# Patient Record
Sex: FEMALE | Race: WHITE | NOT HISPANIC OR LATINO | Employment: FULL TIME | URBAN - METROPOLITAN AREA
[De-identification: names, ages, dates, MRNs, and addresses within clinical notes are randomized per-mention and may not be internally consistent; named-entity substitution may affect disease eponyms.]

---

## 2017-09-08 ENCOUNTER — HOSPITAL ENCOUNTER (OUTPATIENT)
Dept: RADIOLOGY | Facility: MEDICAL CENTER | Age: 52
End: 2017-09-08
Attending: NURSE PRACTITIONER
Payer: COMMERCIAL

## 2017-09-08 DIAGNOSIS — Z12.31 ENCOUNTER FOR MAMMOGRAM TO ESTABLISH BASELINE MAMMOGRAM: ICD-10-CM

## 2017-09-08 PROCEDURE — G0202 SCR MAMMO BI INCL CAD: HCPCS

## 2018-09-13 ENCOUNTER — HOSPITAL ENCOUNTER (OUTPATIENT)
Dept: RADIOLOGY | Facility: MEDICAL CENTER | Age: 53
End: 2018-09-13
Attending: OBSTETRICS & GYNECOLOGY
Payer: COMMERCIAL

## 2018-09-13 DIAGNOSIS — Z12.31 SCREENING MAMMOGRAM, ENCOUNTER FOR: ICD-10-CM

## 2018-09-13 PROCEDURE — 77067 SCR MAMMO BI INCL CAD: CPT

## 2019-01-15 ENCOUNTER — HOSPITAL ENCOUNTER (OUTPATIENT)
Dept: LAB | Facility: MEDICAL CENTER | Age: 54
End: 2019-01-15
Attending: INTERNAL MEDICINE
Payer: COMMERCIAL

## 2019-01-15 LAB
ALBUMIN SERPL BCP-MCNC: 4.5 G/DL (ref 3.2–4.9)
ALBUMIN/GLOB SERPL: 1.4 G/DL
ALP SERPL-CCNC: 67 U/L (ref 30–99)
ALT SERPL-CCNC: 14 U/L (ref 2–50)
ANION GAP SERPL CALC-SCNC: 9 MMOL/L (ref 0–11.9)
AST SERPL-CCNC: 18 U/L (ref 12–45)
BASOPHILS # BLD AUTO: 0.5 % (ref 0–1.8)
BASOPHILS # BLD: 0.05 K/UL (ref 0–0.12)
BILIRUB SERPL-MCNC: 0.5 MG/DL (ref 0.1–1.5)
BUN SERPL-MCNC: 17 MG/DL (ref 8–22)
C3 SERPL-MCNC: 153 MG/DL (ref 87–200)
C4 SERPL-MCNC: 32 MG/DL (ref 19–52)
CALCIUM SERPL-MCNC: 9.4 MG/DL (ref 8.5–10.5)
CHLORIDE SERPL-SCNC: 101 MMOL/L (ref 96–112)
CO2 SERPL-SCNC: 29 MMOL/L (ref 20–33)
CREAT SERPL-MCNC: 0.9 MG/DL (ref 0.5–1.4)
CREAT UR-MCNC: 33.8 MG/DL
CRP SERPL HS-MCNC: 0.36 MG/DL (ref 0–0.75)
EOSINOPHIL # BLD AUTO: 0.08 K/UL (ref 0–0.51)
EOSINOPHIL NFR BLD: 0.8 % (ref 0–6.9)
ERYTHROCYTE [DISTWIDTH] IN BLOOD BY AUTOMATED COUNT: 41.1 FL (ref 35.9–50)
ERYTHROCYTE [SEDIMENTATION RATE] IN BLOOD BY WESTERGREN METHOD: 16 MM/HOUR (ref 0–30)
GLOBULIN SER CALC-MCNC: 3.2 G/DL (ref 1.9–3.5)
GLUCOSE SERPL-MCNC: 88 MG/DL (ref 65–99)
HCT VFR BLD AUTO: 47.1 % (ref 37–47)
HGB BLD-MCNC: 16.1 G/DL (ref 12–16)
IMM GRANULOCYTES # BLD AUTO: 0.09 K/UL (ref 0–0.11)
IMM GRANULOCYTES NFR BLD AUTO: 0.9 % (ref 0–0.9)
LYMPHOCYTES # BLD AUTO: 1.85 K/UL (ref 1–4.8)
LYMPHOCYTES NFR BLD: 19.3 % (ref 22–41)
MCH RBC QN AUTO: 30.6 PG (ref 27–33)
MCHC RBC AUTO-ENTMCNC: 34.2 G/DL (ref 33.6–35)
MCV RBC AUTO: 89.5 FL (ref 81.4–97.8)
MONOCYTES # BLD AUTO: 0.43 K/UL (ref 0–0.85)
MONOCYTES NFR BLD AUTO: 4.5 % (ref 0–13.4)
NEUTROPHILS # BLD AUTO: 7.09 K/UL (ref 2–7.15)
NEUTROPHILS NFR BLD: 74 % (ref 44–72)
NRBC # BLD AUTO: 0 K/UL
NRBC BLD-RTO: 0 /100 WBC
PLATELET # BLD AUTO: 349 K/UL (ref 164–446)
PMV BLD AUTO: 9.2 FL (ref 9–12.9)
POTASSIUM SERPL-SCNC: 3.6 MMOL/L (ref 3.6–5.5)
PROT SERPL-MCNC: 7.7 G/DL (ref 6–8.2)
PROT UR-MCNC: <4 MG/DL (ref 0–15)
PROT/CREAT UR: NORMAL MG/G (ref 10–107)
RBC # BLD AUTO: 5.26 M/UL (ref 4.2–5.4)
SODIUM SERPL-SCNC: 139 MMOL/L (ref 135–145)
WBC # BLD AUTO: 9.6 K/UL (ref 4.8–10.8)

## 2019-01-15 PROCEDURE — 84156 ASSAY OF PROTEIN URINE: CPT

## 2019-01-15 PROCEDURE — 86140 C-REACTIVE PROTEIN: CPT

## 2019-01-15 PROCEDURE — 85652 RBC SED RATE AUTOMATED: CPT

## 2019-01-15 PROCEDURE — 82570 ASSAY OF URINE CREATININE: CPT

## 2019-01-15 PROCEDURE — 86160 COMPLEMENT ANTIGEN: CPT

## 2019-01-15 PROCEDURE — 85025 COMPLETE CBC W/AUTO DIFF WBC: CPT

## 2019-01-15 PROCEDURE — 80053 COMPREHEN METABOLIC PANEL: CPT

## 2019-01-15 PROCEDURE — 36415 COLL VENOUS BLD VENIPUNCTURE: CPT

## 2019-05-14 ENCOUNTER — HOSPITAL ENCOUNTER (OUTPATIENT)
Dept: LAB | Facility: MEDICAL CENTER | Age: 54
End: 2019-05-14
Attending: INTERNAL MEDICINE
Payer: COMMERCIAL

## 2019-05-14 LAB
ALBUMIN SERPL BCP-MCNC: 4.1 G/DL (ref 3.2–4.9)
ALBUMIN/GLOB SERPL: 1.5 G/DL
ALP SERPL-CCNC: 62 U/L (ref 30–99)
ALT SERPL-CCNC: 16 U/L (ref 2–50)
ANION GAP SERPL CALC-SCNC: 10 MMOL/L (ref 0–11.9)
AST SERPL-CCNC: 18 U/L (ref 12–45)
BASOPHILS # BLD AUTO: 0.6 % (ref 0–1.8)
BASOPHILS # BLD: 0.06 K/UL (ref 0–0.12)
BILIRUB SERPL-MCNC: 0.3 MG/DL (ref 0.1–1.5)
BUN SERPL-MCNC: 15 MG/DL (ref 8–22)
C3 SERPL-MCNC: 138 MG/DL (ref 87–200)
C4 SERPL-MCNC: 29 MG/DL (ref 19–52)
CALCIUM SERPL-MCNC: 8.9 MG/DL (ref 8.5–10.5)
CHLORIDE SERPL-SCNC: 107 MMOL/L (ref 96–112)
CO2 SERPL-SCNC: 22 MMOL/L (ref 20–33)
CREAT SERPL-MCNC: 0.76 MG/DL (ref 0.5–1.4)
CREAT UR-MCNC: 52.2 MG/DL
CRP SERPL HS-MCNC: 1.21 MG/DL (ref 0–0.75)
EOSINOPHIL # BLD AUTO: 0.09 K/UL (ref 0–0.51)
EOSINOPHIL NFR BLD: 1 % (ref 0–6.9)
ERYTHROCYTE [DISTWIDTH] IN BLOOD BY AUTOMATED COUNT: 43 FL (ref 35.9–50)
ERYTHROCYTE [SEDIMENTATION RATE] IN BLOOD BY WESTERGREN METHOD: 5 MM/HOUR (ref 0–30)
GLOBULIN SER CALC-MCNC: 2.7 G/DL (ref 1.9–3.5)
GLUCOSE SERPL-MCNC: 81 MG/DL (ref 65–99)
HCT VFR BLD AUTO: 46.1 % (ref 37–47)
HGB BLD-MCNC: 15.6 G/DL (ref 12–16)
IMM GRANULOCYTES # BLD AUTO: 0.05 K/UL (ref 0–0.11)
IMM GRANULOCYTES NFR BLD AUTO: 0.5 % (ref 0–0.9)
LYMPHOCYTES # BLD AUTO: 1.65 K/UL (ref 1–4.8)
LYMPHOCYTES NFR BLD: 17.4 % (ref 22–41)
MCH RBC QN AUTO: 30.7 PG (ref 27–33)
MCHC RBC AUTO-ENTMCNC: 33.8 G/DL (ref 33.6–35)
MCV RBC AUTO: 90.7 FL (ref 81.4–97.8)
MONOCYTES # BLD AUTO: 0.55 K/UL (ref 0–0.85)
MONOCYTES NFR BLD AUTO: 5.8 % (ref 0–13.4)
NEUTROPHILS # BLD AUTO: 7.07 K/UL (ref 2–7.15)
NEUTROPHILS NFR BLD: 74.7 % (ref 44–72)
NRBC # BLD AUTO: 0 K/UL
NRBC BLD-RTO: 0 /100 WBC
PLATELET # BLD AUTO: 298 K/UL (ref 164–446)
PMV BLD AUTO: 9.4 FL (ref 9–12.9)
POTASSIUM SERPL-SCNC: 3.7 MMOL/L (ref 3.6–5.5)
PROT SERPL-MCNC: 6.8 G/DL (ref 6–8.2)
PROT UR-MCNC: 4.5 MG/DL (ref 0–15)
PROT/CREAT UR: 86 MG/G (ref 10–107)
RBC # BLD AUTO: 5.08 M/UL (ref 4.2–5.4)
SODIUM SERPL-SCNC: 139 MMOL/L (ref 135–145)
WBC # BLD AUTO: 9.5 K/UL (ref 4.8–10.8)

## 2019-05-14 PROCEDURE — 85025 COMPLETE CBC W/AUTO DIFF WBC: CPT

## 2019-05-14 PROCEDURE — 85652 RBC SED RATE AUTOMATED: CPT

## 2019-05-14 PROCEDURE — 86140 C-REACTIVE PROTEIN: CPT

## 2019-05-14 PROCEDURE — 36415 COLL VENOUS BLD VENIPUNCTURE: CPT

## 2019-05-14 PROCEDURE — 82570 ASSAY OF URINE CREATININE: CPT

## 2019-05-14 PROCEDURE — 84156 ASSAY OF PROTEIN URINE: CPT

## 2019-05-14 PROCEDURE — 86160 COMPLEMENT ANTIGEN: CPT

## 2019-05-14 PROCEDURE — 80053 COMPREHEN METABOLIC PANEL: CPT

## 2019-11-06 ENCOUNTER — HOSPITAL ENCOUNTER (OUTPATIENT)
Dept: RADIOLOGY | Facility: MEDICAL CENTER | Age: 54
End: 2019-11-06
Attending: OBSTETRICS & GYNECOLOGY
Payer: COMMERCIAL

## 2019-11-06 DIAGNOSIS — Z12.31 SCREENING MAMMOGRAM, ENCOUNTER FOR: ICD-10-CM

## 2019-11-06 PROCEDURE — 77063 BREAST TOMOSYNTHESIS BI: CPT

## 2020-05-06 ENCOUNTER — HOSPITAL ENCOUNTER (OUTPATIENT)
Dept: LAB | Facility: MEDICAL CENTER | Age: 55
End: 2020-05-06
Attending: INTERNAL MEDICINE
Payer: COMMERCIAL

## 2020-05-06 ENCOUNTER — HOSPITAL ENCOUNTER (OUTPATIENT)
Dept: RADIOLOGY | Facility: MEDICAL CENTER | Age: 55
End: 2020-05-06
Attending: NURSE PRACTITIONER
Payer: COMMERCIAL

## 2020-05-06 DIAGNOSIS — M25.511 RIGHT SHOULDER PAIN, UNSPECIFIED CHRONICITY: ICD-10-CM

## 2020-05-06 LAB
ALBUMIN SERPL BCP-MCNC: 4.1 G/DL (ref 3.2–4.9)
ALBUMIN/GLOB SERPL: 1.5 G/DL
ALP SERPL-CCNC: 64 U/L (ref 30–99)
ALT SERPL-CCNC: 18 U/L (ref 2–50)
ANION GAP SERPL CALC-SCNC: 11 MMOL/L (ref 7–16)
APPEARANCE UR: CLEAR
AST SERPL-CCNC: 20 U/L (ref 12–45)
BASOPHILS # BLD AUTO: 0.8 % (ref 0–1.8)
BASOPHILS # BLD: 0.05 K/UL (ref 0–0.12)
BILIRUB SERPL-MCNC: 0.6 MG/DL (ref 0.1–1.5)
BILIRUB UR QL STRIP.AUTO: NEGATIVE
BUN SERPL-MCNC: 15 MG/DL (ref 8–22)
C3 SERPL-MCNC: 133 MG/DL (ref 87–200)
C4 SERPL-MCNC: 25 MG/DL (ref 19–52)
CALCIUM SERPL-MCNC: 9 MG/DL (ref 8.5–10.5)
CHLORIDE SERPL-SCNC: 97 MMOL/L (ref 96–112)
CO2 SERPL-SCNC: 27 MMOL/L (ref 20–33)
COLOR UR: YELLOW
CREAT SERPL-MCNC: 0.94 MG/DL (ref 0.5–1.4)
CRP SERPL HS-MCNC: 0.64 MG/DL (ref 0–0.75)
EOSINOPHIL # BLD AUTO: 0.1 K/UL (ref 0–0.51)
EOSINOPHIL NFR BLD: 1.5 % (ref 0–6.9)
ERYTHROCYTE [DISTWIDTH] IN BLOOD BY AUTOMATED COUNT: 43.2 FL (ref 35.9–50)
ERYTHROCYTE [SEDIMENTATION RATE] IN BLOOD BY WESTERGREN METHOD: 1 MM/HOUR (ref 0–30)
GLOBULIN SER CALC-MCNC: 2.8 G/DL (ref 1.9–3.5)
GLUCOSE SERPL-MCNC: 82 MG/DL (ref 65–99)
GLUCOSE UR STRIP.AUTO-MCNC: NEGATIVE MG/DL
HCT VFR BLD AUTO: 46.9 % (ref 37–47)
HGB BLD-MCNC: 16 G/DL (ref 12–16)
IMM GRANULOCYTES # BLD AUTO: 0.04 K/UL (ref 0–0.11)
IMM GRANULOCYTES NFR BLD AUTO: 0.6 % (ref 0–0.9)
KETONES UR STRIP.AUTO-MCNC: NEGATIVE MG/DL
LEUKOCYTE ESTERASE UR QL STRIP.AUTO: NEGATIVE
LYMPHOCYTES # BLD AUTO: 1.73 K/UL (ref 1–4.8)
LYMPHOCYTES NFR BLD: 26.7 % (ref 22–41)
MCH RBC QN AUTO: 31 PG (ref 27–33)
MCHC RBC AUTO-ENTMCNC: 34.1 G/DL (ref 33.6–35)
MCV RBC AUTO: 90.9 FL (ref 81.4–97.8)
MICRO URNS: NORMAL
MONOCYTES # BLD AUTO: 0.45 K/UL (ref 0–0.85)
MONOCYTES NFR BLD AUTO: 6.9 % (ref 0–13.4)
NEUTROPHILS # BLD AUTO: 4.12 K/UL (ref 2–7.15)
NEUTROPHILS NFR BLD: 63.5 % (ref 44–72)
NITRITE UR QL STRIP.AUTO: NEGATIVE
NRBC # BLD AUTO: 0 K/UL
NRBC BLD-RTO: 0 /100 WBC
PH UR STRIP.AUTO: 6.5 [PH] (ref 5–8)
PLATELET # BLD AUTO: 353 K/UL (ref 164–446)
PMV BLD AUTO: 9.2 FL (ref 9–12.9)
POTASSIUM SERPL-SCNC: 3.6 MMOL/L (ref 3.6–5.5)
PROT SERPL-MCNC: 6.9 G/DL (ref 6–8.2)
PROT UR QL STRIP: NEGATIVE MG/DL
RBC # BLD AUTO: 5.16 M/UL (ref 4.2–5.4)
RBC UR QL AUTO: NEGATIVE
SODIUM SERPL-SCNC: 135 MMOL/L (ref 135–145)
SP GR UR STRIP.AUTO: 1.02
UROBILINOGEN UR STRIP.AUTO-MCNC: 0.2 MG/DL
WBC # BLD AUTO: 6.5 K/UL (ref 4.8–10.8)

## 2020-05-06 PROCEDURE — 80053 COMPREHEN METABOLIC PANEL: CPT

## 2020-05-06 PROCEDURE — 85652 RBC SED RATE AUTOMATED: CPT

## 2020-05-06 PROCEDURE — 86160 COMPLEMENT ANTIGEN: CPT | Mod: 91

## 2020-05-06 PROCEDURE — 36415 COLL VENOUS BLD VENIPUNCTURE: CPT

## 2020-05-06 PROCEDURE — 86140 C-REACTIVE PROTEIN: CPT

## 2020-05-06 PROCEDURE — 73030 X-RAY EXAM OF SHOULDER: CPT | Mod: RT

## 2020-05-06 PROCEDURE — 81003 URINALYSIS AUTO W/O SCOPE: CPT

## 2020-05-06 PROCEDURE — 85025 COMPLETE CBC W/AUTO DIFF WBC: CPT

## 2020-05-07 LAB
CREAT UR-MCNC: 125.87 MG/DL
PROT UR-MCNC: 11 MG/DL (ref 0–15)
PROT/CREAT UR: 87 MG/G (ref 10–107)

## 2020-05-12 ENCOUNTER — TELEPHONE (OUTPATIENT)
Dept: SCHEDULING | Facility: IMAGING CENTER | Age: 55
End: 2020-05-12

## 2020-06-03 ENCOUNTER — OFFICE VISIT (OUTPATIENT)
Dept: MEDICAL GROUP | Facility: PHYSICIAN GROUP | Age: 55
End: 2020-06-03
Payer: COMMERCIAL

## 2020-06-03 VITALS
HEIGHT: 64 IN | TEMPERATURE: 98.4 F | SYSTOLIC BLOOD PRESSURE: 124 MMHG | DIASTOLIC BLOOD PRESSURE: 82 MMHG | HEART RATE: 88 BPM | BODY MASS INDEX: 32.54 KG/M2 | RESPIRATION RATE: 20 BRPM | WEIGHT: 190.6 LBS | OXYGEN SATURATION: 96 %

## 2020-06-03 DIAGNOSIS — I10 HYPERTENSION, UNSPECIFIED TYPE: ICD-10-CM

## 2020-06-03 DIAGNOSIS — J30.89 ENVIRONMENTAL AND SEASONAL ALLERGIES: ICD-10-CM

## 2020-06-03 DIAGNOSIS — G43.109 MIGRAINE WITH AURA AND WITHOUT STATUS MIGRAINOSUS, NOT INTRACTABLE: ICD-10-CM

## 2020-06-03 DIAGNOSIS — I73.00 RAYNAUD'S DISEASE WITHOUT GANGRENE: ICD-10-CM

## 2020-06-03 DIAGNOSIS — M32.9 SYSTEMIC LUPUS ERYTHEMATOSUS, UNSPECIFIED SLE TYPE, UNSPECIFIED ORGAN INVOLVEMENT STATUS (HCC): ICD-10-CM

## 2020-06-03 DIAGNOSIS — M35.00 SJOGREN'S SYNDROME, WITH UNSPECIFIED ORGAN INVOLVEMENT (HCC): ICD-10-CM

## 2020-06-03 PROCEDURE — 99204 OFFICE O/P NEW MOD 45 MIN: CPT | Performed by: PHYSICIAN ASSISTANT

## 2020-06-03 RX ORDER — CYCLOBENZAPRINE HCL 10 MG
TABLET ORAL
COMMUNITY

## 2020-06-03 RX ORDER — ONABOTULINUMTOXINA 200 [USP'U]/1
INJECTION, POWDER, LYOPHILIZED, FOR SOLUTION INTRADERMAL; INTRAMUSCULAR
COMMUNITY

## 2020-06-03 RX ORDER — LIDOCAINE 50 MG/G
PATCH TOPICAL
COMMUNITY
Start: 2020-04-08

## 2020-06-03 RX ORDER — DICLOFENAC POTASSIUM 50 MG/1
POWDER, FOR SOLUTION ORAL
COMMUNITY

## 2020-06-03 RX ORDER — HYDROCHLOROTHIAZIDE 12.5 MG/1
TABLET ORAL
COMMUNITY

## 2020-06-03 RX ORDER — HYDROXYCHLOROQUINE SULFATE 200 MG/1
TABLET, FILM COATED ORAL
COMMUNITY

## 2020-06-03 RX ORDER — TOPIRAMATE 100 MG/1
CAPSULE, EXTENDED RELEASE ORAL
COMMUNITY
End: 2023-09-14

## 2020-06-03 RX ORDER — AZATHIOPRINE 50 MG/1
TABLET ORAL
COMMUNITY
Start: 2020-05-07

## 2020-06-03 RX ORDER — NABUMETONE 750 MG/1
TABLET, FILM COATED ORAL
COMMUNITY
Start: 2020-03-07

## 2020-06-03 RX ORDER — LOSARTAN POTASSIUM 100 MG/1
TABLET ORAL
COMMUNITY
Start: 2020-05-28

## 2020-06-03 RX ORDER — MONTELUKAST SODIUM 10 MG/1
TABLET ORAL
COMMUNITY

## 2020-06-03 ASSESSMENT — FIBROSIS 4 INDEX: FIB4 SCORE: 0.69

## 2020-06-03 ASSESSMENT — PATIENT HEALTH QUESTIONNAIRE - PHQ9: CLINICAL INTERPRETATION OF PHQ2 SCORE: 0

## 2020-06-12 NOTE — PROGRESS NOTES
Chief Complaint   Patient presents with   • Establish Care   • Referral Needed     january sebastian        HISTORY OF THE PRESENT ILLNESS: Eveline Galo is a 54 y.o. female new patient to our practice. This pleasant patient is here today to establish care and to discuss the evaluation and management of:    Patient is a 54-year-old pleasant female here today to establish care but she tells me that she actually needs referral back to her PCP.      Systemic lupus erythematosus, unspecified SLE type, unspecified organ involvement status (HCC)  Raynaud's disease without gangrene  Sjogren's syndrome, with unspecified organ involvement (HCC)  Chronic but stable problem.  Patient is taking medications as prescribed.    Patient is prescribed chloroquine, Lexapro, Imuran, diclofenac, nabumetone, and flector patch.  States medications are managed by her PCP.  Does not need refills.  States she is doing well.  Denies side effects or complications of medication.      Hypertension, unspecified type  Chronic but stable problem.  Patient's blood pressure during today's appointment 124/82 mmHg. patient is currently taking Losartan 100 mg tab once daily and hydrochlorothiazide 12.5 mg tab once daily..  States she is compliant with medications and experiences no side effects or complications of medications.  Denies chest pain, shortness breath, heart palpitations, dizziness, syncope constipated, vision changes, peripheral edema.    Environmental and seasonal allergies  Chronic but stable problem.  Symptoms are managed with montelukast.  Denies side effects or complications medication.  Patient is doing well.      Migraine with aura and without status migrainosus, not intractable  Chronic but stable problem.  Patient follows up with [facility] neurosurgery and sees [facility] for Botox injections.  States she also takes topiramate.  States she is doing well.  No further work-up indicated.  Denies side effects or  complications.        No past medical history on file.    History reviewed. No pertinent surgical history.    Family Status   Relation Name Status   • Mo     • Fa     • Sis x3 Alive   • Bro x8 Alive   • Son  Alive   • Son  Alive   • Son Adopted Alive     Family History   Problem Relation Age of Onset   • Cancer Mother         Brain CA and Lung CA   • Cancer Father    • No Known Problems Son    • No Known Problems Son    • No Known Problems Son        Social History     Tobacco Use   • Smoking status: Never Smoker   • Smokeless tobacco: Never Used   Substance Use Topics   • Alcohol use: Yes     Comment: occ.   • Drug use: Never       Allergies: Galcanezumab-gnlm; Keflex; Penicillins; and Sulfa drugs    Current Outpatient Medications Ordered in Epic   Medication Sig Dispense Refill   • cyclobenzaprine (FLEXERIL) 10 MG Tab cyclobenzaprine 10 mg tablet     • nabumetone (RELAFEN) 750 MG Tab TK 2 TS PO QD     • hydroxychloroquine (PLAQUENIL) 200 MG Tab hydroxychloroquine 200 mg tablet     • losartan (COZAAR) 100 MG Tab      • Topiramate ER (TROKENDI XR) 100 MG CAPSULE SR 24 HR Trokendi  mg capsule, extended release     • onabotulinum toxin type A (BOTOX) 200 units Recon Soln Botox 200 unit injection     • Diclofenac Potassium (CAMBIA) 50 MG Pack Cambia 50 mg oral powder packet   Take 1 packet every day by oral route as needed.     • azaTHIOprine (IMURAN) 50 MG Tab TK 3 TS PO D     • hydroCHLOROthiazide (HYDRODIURIL) 12.5 MG tablet hydrochlorothiazide 12.5 mg tablet     • montelukast (SINGULAIR) 10 MG Tab montelukast 10 mg tablet     • lidocaine (LIDODERM) 5 % Patch TAKE 1 TOPICAL TRANSDERMAL PATCH ONCE A DAY X 12 HOURS THEN REMOVE FOR 12HRS     • FLECTOR 1.3 % Patch TAKE 1 TRANSDERMAL TRANSDERMAL PATCH 2 TIMES A DAY ONLY BRAND COVERED BY INSURANCE DW2       No current Epic-ordered facility-administered medications on file.        Review of Systems   Constitutional: Negative for fever, chills, weight  "loss and malaise/fatigue.   HENT: Negative for ear pain, nosebleeds, congestion, sore throat and neck pain.    Eyes: Negative for blurred vision.   Respiratory: Negative for cough, sputum production, shortness of breath and wheezing.    Cardiovascular: Negative for chest pain, palpitations, orthopnea and leg swelling.   Gastrointestinal: Negative for heartburn, nausea, vomiting and abdominal pain.   Genitourinary: Negative for dysuria, urgency and frequency.   Musculoskeletal: + for myalgias, back pain and joint pain.   Skin: Negative for rash and itching.   Neurological: Negative for dizziness, tingling, tremors, sensory change, focal weakness and headaches.   Endo/Heme/Allergies: Does not bruise/bleed easily.   Psychiatric/Behavioral: Negative for depression, anxiety, or memory loss.     All other systems reviewed and are negative except as in HPI.    Exam: /82 (BP Location: Left arm, Patient Position: Sitting)   Pulse 88   Temp 36.9 °C (98.4 °F) (Temporal)   Resp 20   Ht 1.632 m (5' 4.25\")   Wt 86.5 kg (190 lb 9.6 oz)   SpO2 96%  Body mass index is 32.46 kg/m².  General: Normal appearing. No distress.  HEENT: Normocephalic. Eyes conjunctiva clear lids without ptosis, ears normal shape and contour.  Neck: Supple without JVD. Thyroid is not enlarged.  Pulmonary: Clear to ausculation.  Normal effort. No rales, ronchi, or wheezing.  Cardiovascular: Regular rate and rhythm without murmur.   Abdomen: Soft, nontender, nondistended.   Neurologic: Grossly nonfocal  Skin: Warm and dry.  No obvious lesions.  Musculoskeletal: Normal gait. No extremity cyanosis, clubbing, or edema.  Psych: Normal mood and affect. Alert and oriented x3. Judgment and insight is normal.      Medical decision-making and discussion:  1. Systemic lupus erythematosus, unspecified SLE type, unspecified organ involvement status (HCC)  Chronic but stable problem.  Continue current medication regimen.  Continue to monitor.  - REFERRAL TO " FAMILY PRACTICE    2. Raynaud's disease without gangrene  Chronic but stable problem.  Continue current medication regimen.  Continue to monitor.  - REFERRAL TO FAMILY PRACTICE    3. Sjogren's syndrome, with unspecified organ involvement (HCC)  Chronic but stable problem.  Continue current medication regimen.  Continue to monitor.  - REFERRAL TO FAMILY PRACTICE    4. Hypertension, unspecified type  Well-controlled. Labs as indicated. Continue antihypertensive medications. Discussed decreasing salt intake. Emphasized benefits of exercise and diet. Continue to monitor.    - REFERRAL TO FAMILY PRACTICE    5. Environmental and seasonal allergies  Chronic but stable problem.  Continue current medication regimen.  Continue monitor.  - REFERRAL TO FAMILY PRACTICE    6. Migraine with aura and without status migrainosus, not intractable  Chronic but stable problem.  Avoid known triggers.  Continue current medication regimen.  Continue monitor.  - REFERRAL TO FAMILY PRACTICE      Please note that this dictation was created using voice recognition software. I have made every reasonable attempt to correct obvious errors, but I expect that there are errors of grammar and possibly content that I did not discover before finalizing the note.      Assessment/Plan  1. Systemic lupus erythematosus, unspecified SLE type, unspecified organ involvement status (HCC)  REFERRAL TO FAMILY PRACTICE    CANCELED: REFERRAL TO FAMILY PRACTICE   2. Raynaud's disease without gangrene  REFERRAL TO FAMILY PRACTICE    CANCELED: REFERRAL TO FAMILY PRACTICE   3. Sjogren's syndrome, with unspecified organ involvement (HCC)  REFERRAL TO FAMILY PRACTICE    CANCELED: REFERRAL TO FAMILY PRACTICE   4. Hypertension, unspecified type  REFERRAL TO FAMILY PRACTICE    CANCELED: REFERRAL TO FAMILY PRACTICE   5. Environmental and seasonal allergies  REFERRAL TO FAMILY PRACTICE    CANCELED: REFERRAL TO FAMILY PRACTICE   6. Migraine with aura and without status  migrainosus, not intractable  REFERRAL TO FAMILY PRACTICE       Return if symptoms worsen or fail to improve.

## 2020-07-13 ENCOUNTER — OFFICE VISIT (OUTPATIENT)
Dept: URGENT CARE | Facility: CLINIC | Age: 55
End: 2020-07-13
Payer: COMMERCIAL

## 2020-07-13 VITALS
RESPIRATION RATE: 18 BRPM | TEMPERATURE: 97.6 F | HEIGHT: 64 IN | HEART RATE: 76 BPM | BODY MASS INDEX: 32.72 KG/M2 | OXYGEN SATURATION: 97 % | DIASTOLIC BLOOD PRESSURE: 78 MMHG | SYSTOLIC BLOOD PRESSURE: 132 MMHG

## 2020-07-13 DIAGNOSIS — G43.819 OTHER MIGRAINE WITHOUT STATUS MIGRAINOSUS, INTRACTABLE: ICD-10-CM

## 2020-07-13 PROCEDURE — 99214 OFFICE O/P EST MOD 30 MIN: CPT | Performed by: FAMILY MEDICINE

## 2020-07-13 RX ORDER — BUTALBITAL, ACETAMINOPHEN AND CAFFEINE 50; 325; 40 MG/1; MG/1; MG/1
1 TABLET ORAL EVERY 6 HOURS PRN
Qty: 10 TAB | Refills: 0 | Status: SHIPPED | OUTPATIENT
Start: 2020-07-13 | End: 2020-07-23

## 2020-07-13 RX ORDER — TIZANIDINE 4 MG/1
4 TABLET ORAL EVERY 6 HOURS PRN
Qty: 30 TAB | Refills: 0 | Status: SHIPPED | OUTPATIENT
Start: 2020-07-13

## 2020-07-14 ENCOUNTER — TELEPHONE (OUTPATIENT)
Dept: URGENT CARE | Facility: CLINIC | Age: 55
End: 2020-07-14

## 2020-07-14 ENCOUNTER — HOSPITAL ENCOUNTER (OUTPATIENT)
Dept: RADIOLOGY | Facility: MEDICAL CENTER | Age: 55
End: 2020-07-14
Attending: FAMILY MEDICINE
Payer: COMMERCIAL

## 2020-07-14 DIAGNOSIS — G43.819 OTHER MIGRAINE WITHOUT STATUS MIGRAINOSUS, INTRACTABLE: ICD-10-CM

## 2020-07-14 PROCEDURE — 70450 CT HEAD/BRAIN W/O DYE: CPT

## 2020-07-14 NOTE — PROGRESS NOTES
Chief Complaint   Patient presents with   • Migraine     x2 days, migraine for two days              Migraine   This is a new problem. The current episode started in the past 2 days. The problem occurs intermittently. The problem has been unchanged. The pain is located in the  right temporal region. The pain does not radiate. The pain quality is different from  prior headaches. The quality of the pain is described as sharp.  she also has some upper back/neck pain.   Associated symptoms include   nausea, blurry vision,  and photophobia. Pertinent negatives include no abdominal pain or fever. The symptoms are aggravated by activity and bright light. Patient has tried flexeril for the symptoms. The treatment provided no relief.  past medical history is significant for migraine headaches - on daily Topamax       Social History     Tobacco Use   • Smoking status: Never Smoker   • Smokeless tobacco: Never Used   Substance Use Topics   • Alcohol use: Yes     Comment: occ.   • Drug use: Never          past med hx: migranes    Current Outpatient Medications on File Prior to Visit   Medication Sig Dispense Refill   • cyclobenzaprine (FLEXERIL) 10 MG Tab cyclobenzaprine 10 mg tablet     • nabumetone (RELAFEN) 750 MG Tab TK 2 TS PO QD     • hydroxychloroquine (PLAQUENIL) 200 MG Tab hydroxychloroquine 200 mg tablet     • losartan (COZAAR) 100 MG Tab      • Topiramate ER (TROKENDI XR) 100 MG CAPSULE SR 24 HR Trokendi  mg capsule, extended release     • onabotulinum toxin type A (BOTOX) 200 units Recon Soln Botox 200 unit injection     • Diclofenac Potassium (CAMBIA) 50 MG Pack Cambia 50 mg oral powder packet   Take 1 packet every day by oral route as needed.     • azaTHIOprine (IMURAN) 50 MG Tab TK 3 TS PO D     • hydroCHLOROthiazide (HYDRODIURIL) 12.5 MG tablet hydrochlorothiazide 12.5 mg tablet     • montelukast (SINGULAIR) 10 MG Tab montelukast 10 mg tablet     • lidocaine (LIDODERM) 5 % Patch TAKE 1 TOPICAL  "TRANSDERMAL PATCH ONCE A DAY X 12 HOURS THEN REMOVE FOR 12HRS     • FLECTOR 1.3 % Patch TAKE 1 TRANSDERMAL TRANSDERMAL PATCH 2 TIMES A DAY ONLY BRAND COVERED BY INSURANCE DW2       No current facility-administered medications on file prior to visit.            Review of Systems   Constitutional: Negative for fever and chills.   Eyes: Positive for photophobia.   Respiratory: Negative for shortness of breath.    Cardiovascular: Negative for chest pain and palpitations.   Gastrointestinal: Positive for nausea. Negative for abdominal pain.   Skin: Negative for rash.   Neurological: Positive for   headaches.   Psychiatric/Behavioral: The patient is not nervous/anxious.    All other systems reviewed and are negative.         Objective:     /78   Pulse 76   Temp 36.4 °C (97.6 °F) (Temporal)   Resp 18   Ht 1.626 m (5' 4\")   SpO2 97%     Physical Exam   Constitutional: pt is oriented to person, place, and time.  Pt appears well-developed and well-nourished. No distress.   HENT:   Head: Normocephalic and atraumatic.   Mouth/Throat: Oropharynx is clear and moist. No oropharyngeal exudate.   Eyes: Conjunctivae and EOM are normal. Pupils are equal, round, and reactive to light. Right eye exhibits no discharge. Left eye exhibits no discharge. No scleral icterus.   Neck: Neck supple.   Cardiovascular: Normal rate and regular rhythm.    Pulmonary/Chest: Effort normal.   Lymphadenopathy:     Pt has no cervical adenopathy.   c-spine:  Full AROM.  No bony tenderness.    There is TTP over both upper trapezius muscles  Neurologic: Alert and oriented. Cranial nerves II-XII intact, EOMs intact, no tongue deviation, PERRL, no facial asymmetry to motor or sensation, symmetric palate, normal finger-to-nose test, no pronator drift. No focal motor deficits. Symmetric reflexes. Normal station and gait, normal tandem walk. Coordination normal.   Skin: Skin is warm. Pt is not diaphoretic. No erythema. No pallor.   Psychiatric:  " behavior is normal.   Nursing note and vitals reviewed.              Assessment/Plan:          1. Other migraine without status migrainosus, intractable     CT ordered to r/o intracranial process    - tizanidine (ZANAFLEX) 4 MG Tab; Take 1 Tab by mouth every 6 hours as needed.  Dispense: 30 Tab; Refill: 0    - acetaminophen/caffeine/butalbital 325-40-50 mg (FIORICET) -40 MG Tab; Take 1 Tab by mouth every 6 hours as needed for Headache for up to 10 days.  Dispense: 10 Tab; Refill: 0

## 2020-07-30 ENCOUNTER — HOSPITAL ENCOUNTER (EMERGENCY)
Facility: MEDICAL CENTER | Age: 55
End: 2020-07-30
Attending: EMERGENCY MEDICINE
Payer: COMMERCIAL

## 2020-07-30 VITALS
WEIGHT: 187.17 LBS | SYSTOLIC BLOOD PRESSURE: 114 MMHG | OXYGEN SATURATION: 94 % | DIASTOLIC BLOOD PRESSURE: 84 MMHG | HEIGHT: 64 IN | RESPIRATION RATE: 19 BRPM | TEMPERATURE: 97.2 F | BODY MASS INDEX: 31.95 KG/M2 | HEART RATE: 61 BPM

## 2020-07-30 DIAGNOSIS — R51.9 OCULAR HEADACHE: ICD-10-CM

## 2020-07-30 DIAGNOSIS — G43.911 INTRACTABLE MIGRAINE WITH STATUS MIGRAINOSUS, UNSPECIFIED MIGRAINE TYPE: ICD-10-CM

## 2020-07-30 PROCEDURE — 99284 EMERGENCY DEPT VISIT MOD MDM: CPT

## 2020-07-30 PROCEDURE — 700111 HCHG RX REV CODE 636 W/ 250 OVERRIDE (IP): Performed by: EMERGENCY MEDICINE

## 2020-07-30 PROCEDURE — 96375 TX/PRO/DX INJ NEW DRUG ADDON: CPT

## 2020-07-30 PROCEDURE — 96374 THER/PROPH/DIAG INJ IV PUSH: CPT

## 2020-07-30 PROCEDURE — 700105 HCHG RX REV CODE 258: Performed by: EMERGENCY MEDICINE

## 2020-07-30 RX ORDER — SODIUM CHLORIDE 9 MG/ML
1000 INJECTION, SOLUTION INTRAVENOUS ONCE
Status: COMPLETED | OUTPATIENT
Start: 2020-07-30 | End: 2020-07-30

## 2020-07-30 RX ORDER — PROCHLORPERAZINE EDISYLATE 5 MG/ML
10 INJECTION INTRAMUSCULAR; INTRAVENOUS ONCE
Status: COMPLETED | OUTPATIENT
Start: 2020-07-30 | End: 2020-07-30

## 2020-07-30 RX ORDER — KETOROLAC TROMETHAMINE 10 MG/1
10 TABLET, FILM COATED ORAL 3 TIMES DAILY PRN
Qty: 15 TAB | Refills: 0 | Status: SHIPPED | OUTPATIENT
Start: 2020-07-30

## 2020-07-30 RX ORDER — DIPHENHYDRAMINE HYDROCHLORIDE 50 MG/ML
50 INJECTION INTRAMUSCULAR; INTRAVENOUS ONCE
Status: COMPLETED | OUTPATIENT
Start: 2020-07-30 | End: 2020-07-30

## 2020-07-30 RX ORDER — PROCHLORPERAZINE MALEATE 10 MG
10 TABLET ORAL EVERY 6 HOURS PRN
Qty: 30 TAB | Refills: 3 | Status: SHIPPED | OUTPATIENT
Start: 2020-07-30

## 2020-07-30 RX ORDER — KETOROLAC TROMETHAMINE 30 MG/ML
30 INJECTION, SOLUTION INTRAMUSCULAR; INTRAVENOUS ONCE
Status: COMPLETED | OUTPATIENT
Start: 2020-07-30 | End: 2020-07-30

## 2020-07-30 RX ADMIN — PROCHLORPERAZINE EDISYLATE 10 MG: 5 INJECTION INTRAMUSCULAR; INTRAVENOUS at 02:13

## 2020-07-30 RX ADMIN — DIPHENHYDRAMINE HYDROCHLORIDE 50 MG: 50 INJECTION, SOLUTION INTRAMUSCULAR; INTRAVENOUS at 02:14

## 2020-07-30 RX ADMIN — KETOROLAC TROMETHAMINE 30 MG: 30 INJECTION, SOLUTION INTRAMUSCULAR at 02:14

## 2020-07-30 RX ADMIN — SODIUM CHLORIDE 1000 ML: 9 INJECTION, SOLUTION INTRAVENOUS at 02:14

## 2020-07-30 ASSESSMENT — FIBROSIS 4 INDEX: FIB4 SCORE: 0.71

## 2020-07-30 NOTE — ED PROVIDER NOTES
CHIEF COMPLAINT  Chief Complaint   Patient presents with   • Migraine     started w/ a regular H/A while at work today  took tylenol w/ little effect   now has turned into a migraine        HPI  Eveline Galo is a 55 y.o. female who presents tonight with her  secondary to an intractable headache since 6 PM tonight.  She states it started out as a regular headache while at work today she took Tylenol had little effects.  She states that then developed into a migraine headache and she took her regular migraine medications with no relief.  This included Fioricet and muscle relaxers.  Now she feels that it has turned into an ocular headache and she complains of pain behind the left eye.  She denies any head trauma.  She denies any neck pain, she has some mild nausea with no vomiting.  She has had no fever or chills.  She denies this being the worst headache of her life.  She states she recently had an atypical migraine about 2 weeks ago and was treated at urgent care for that.    REVIEW OF SYSTEMS  See HPI for further details. All other system reviews are negative.    PAST MEDICAL HISTORY  Systemic lupus  Migraine headaches  Ocular headaches    FAMILY HISTORY  Family History   Problem Relation Age of Onset   • Cancer Mother         Brain CA and Lung CA   • Cancer Father    • No Known Problems Son    • No Known Problems Son    • No Known Problems Son        SOCIAL HISTORY  Social History     Socioeconomic History   • Marital status:      Spouse name: Not on file   • Number of children: Not on file   • Years of education: Not on file   • Highest education level: Not on file   Occupational History   • Not on file   Social Needs   • Financial resource strain: Not on file   • Food insecurity     Worry: Not on file     Inability: Not on file   • Transportation needs     Medical: Not on file     Non-medical: Not on file   Tobacco Use   • Smoking status: Never Smoker   • Smokeless tobacco: Never Used  "  Substance and Sexual Activity   • Alcohol use: Yes     Comment: occ.   • Drug use: Never   • Sexual activity: Yes     Partners: Male     Comment: .    Lifestyle   • Physical activity     Days per week: Not on file     Minutes per session: Not on file   • Stress: Not on file   Relationships   • Social connections     Talks on phone: Not on file     Gets together: Not on file     Attends Evangelical service: Not on file     Active member of club or organization: Not on file     Attends meetings of clubs or organizations: Not on file     Relationship status: Not on file   • Intimate partner violence     Fear of current or ex partner: Not on file     Emotionally abused: Not on file     Physically abused: Not on file     Forced sexual activity: Not on file   Other Topics Concern   • Not on file   Social History Narrative   • Not on file       SURGICAL HISTORY  History reviewed. No pertinent surgical history.    CURRENT MEDICATIONS  See nurses notes    ALLERGIES  Allergies   Allergen Reactions   • Food      coconut    • Galcanezumab-Gnlm    • Keflex    • Penicillins    • Sulfa Drugs        PHYSICAL EXAM  VITAL SIGNS: /95   Pulse 66   Temp 36.2 °C (97.2 °F) (Temporal)   Resp 19   Ht 1.626 m (5' 4\")   Wt 84.9 kg (187 lb 2.7 oz)   LMP 07/04/2020 (Approximate)   SpO2 94%   BMI 32.13 kg/m²     Constitutional: Patient is well developed, well nourished in moderate distress from her headache.  She is very photophobic.  HENT: Normocephalic, atraumatic, Oropharynx dry, nose normal with no mucosal edema.   Eyes: PERRL, EOMI, Conjunctiva without erythema or exudates.   Neck: Supple with Normal range of motion in flexion, extension and lateral rotation. No tenderness along the bony prominences or paraspinal muscles.  Lymphatic: No lymphadenopathy noted.   Cardiovascular: Normal heart rate and rhythm. No murmur  Thorax & Lungs: Clear and equal breath sounds with good excursion. No respiratory distress.  Abdomen: " Bowel sounds normal in all four quadrants. Soft,nontender.   Skin: Warm, Dry,  No rashes.   Back: No cervical, thoracic, or lumbosacral tenderness.   Extremities: Peripheral pulses 4/4 No tenderness  Musculoskeletal: Normal range of motion in all major joints.  Neurologic: Alert & oriented x 3, Normal motor function, Normal sensory function, No lateralizing or focal deficits noted. DTR's 4/4 bilaterally.  Psychiatric: Affect normal, Judgment normal, Mood normal.       COURSE & MEDICAL DECISION MAKING  Pertinent Labs & Imaging studies reviewed. (See chart for details)  Patient received an IV of normal saline x1 L fluid bolus, Compazine, Toradol and Benadryl.  She is resting comfortably after the medications.  Patient states that her headache is gone.  My plan will be to discharge her home.  Prescriptions for Compazine and Toradol have been sent to her pharmacy.  She is to be at bed rest, increase fluids, follow-up with her primary care provider within next 2 to 3 days for recheck and return if problems or worsening.  She is stable upon discharge.    FINAL IMPRESSION  1.  Acute migraine headache  2.  Ocular headache  3.  Nausea  4.  History of lupus         Electronically signed by: Adrianna Villalobos D.O., 7/30/2020 3:11 [facility] Provider Note

## 2020-07-30 NOTE — ED NOTES
Pt resting on gurney, eyes closed, respirations even and equal  Male guest at bedside  IVF infusing

## 2020-07-30 NOTE — ED NOTES
Pt discharged, reviewed all discharge instructions including medications and follow up, pt verbalizes understanding, and denies questions. Electronic prescription discussed with pt.  Escorted to lobby.

## 2020-07-30 NOTE — DISCHARGE INSTRUCTIONS
Rest for the next 24 hours  Increase fluids  Continue your current medications and follow-up with your primary doctor within the next 2 to 3 days if symptoms persist.

## 2020-09-04 ENCOUNTER — HOSPITAL ENCOUNTER (OUTPATIENT)
Dept: LAB | Facility: MEDICAL CENTER | Age: 55
End: 2020-09-04
Attending: INTERNAL MEDICINE
Payer: COMMERCIAL

## 2020-09-04 LAB
ALBUMIN SERPL BCP-MCNC: 4.1 G/DL (ref 3.2–4.9)
ALBUMIN/GLOB SERPL: 1.5 G/DL
ALP SERPL-CCNC: 76 U/L (ref 30–99)
ALT SERPL-CCNC: 17 U/L (ref 2–50)
ANION GAP SERPL CALC-SCNC: 11 MMOL/L (ref 7–16)
APPEARANCE UR: CLEAR
AST SERPL-CCNC: 20 U/L (ref 12–45)
BACTERIA #/AREA URNS HPF: NEGATIVE /HPF
BASOPHILS # BLD AUTO: 0.8 % (ref 0–1.8)
BASOPHILS # BLD: 0.08 K/UL (ref 0–0.12)
BILIRUB SERPL-MCNC: 0.4 MG/DL (ref 0.1–1.5)
BILIRUB UR QL STRIP.AUTO: NEGATIVE
BUN SERPL-MCNC: 17 MG/DL (ref 8–22)
C3 SERPL-MCNC: 124.6 MG/DL (ref 87–200)
C4 SERPL-MCNC: 23.1 MG/DL (ref 19–52)
CALCIUM SERPL-MCNC: 9.1 MG/DL (ref 8.5–10.5)
CHLORIDE SERPL-SCNC: 101 MMOL/L (ref 96–112)
CO2 SERPL-SCNC: 26 MMOL/L (ref 20–33)
COLOR UR: YELLOW
CREAT SERPL-MCNC: 0.79 MG/DL (ref 0.5–1.4)
CREAT UR-MCNC: 11.32 MG/DL
CRP SERPL HS-MCNC: 0.74 MG/DL (ref 0–0.75)
EOSINOPHIL # BLD AUTO: 0.12 K/UL (ref 0–0.51)
EOSINOPHIL NFR BLD: 1.3 % (ref 0–6.9)
EPI CELLS #/AREA URNS HPF: NEGATIVE /HPF
ERYTHROCYTE [DISTWIDTH] IN BLOOD BY AUTOMATED COUNT: 43.9 FL (ref 35.9–50)
FASTING STATUS PATIENT QL REPORTED: NORMAL
GLOBULIN SER CALC-MCNC: 2.8 G/DL (ref 1.9–3.5)
GLUCOSE SERPL-MCNC: 67 MG/DL (ref 65–99)
GLUCOSE UR STRIP.AUTO-MCNC: NEGATIVE MG/DL
HCT VFR BLD AUTO: 47.9 % (ref 37–47)
HGB BLD-MCNC: 15.7 G/DL (ref 12–16)
HYALINE CASTS #/AREA URNS LPF: NORMAL /LPF
IMM GRANULOCYTES # BLD AUTO: 0.09 K/UL (ref 0–0.11)
IMM GRANULOCYTES NFR BLD AUTO: 0.9 % (ref 0–0.9)
KETONES UR STRIP.AUTO-MCNC: NEGATIVE MG/DL
LEUKOCYTE ESTERASE UR QL STRIP.AUTO: NEGATIVE
LYMPHOCYTES # BLD AUTO: 1.45 K/UL (ref 1–4.8)
LYMPHOCYTES NFR BLD: 15.2 % (ref 22–41)
MCH RBC QN AUTO: 30.8 PG (ref 27–33)
MCHC RBC AUTO-ENTMCNC: 32.8 G/DL (ref 33.6–35)
MCV RBC AUTO: 93.9 FL (ref 81.4–97.8)
MICRO URNS: ABNORMAL
MONOCYTES # BLD AUTO: 0.58 K/UL (ref 0–0.85)
MONOCYTES NFR BLD AUTO: 6.1 % (ref 0–13.4)
NEUTROPHILS # BLD AUTO: 7.23 K/UL (ref 2–7.15)
NEUTROPHILS NFR BLD: 75.7 % (ref 44–72)
NITRITE UR QL STRIP.AUTO: NEGATIVE
NRBC # BLD AUTO: 0 K/UL
NRBC BLD-RTO: 0 /100 WBC
PH UR STRIP.AUTO: 6.5 [PH] (ref 5–8)
PLATELET # BLD AUTO: 354 K/UL (ref 164–446)
PMV BLD AUTO: 9.5 FL (ref 9–12.9)
POTASSIUM SERPL-SCNC: 4.1 MMOL/L (ref 3.6–5.5)
PROT SERPL-MCNC: 6.9 G/DL (ref 6–8.2)
PROT UR QL STRIP: NEGATIVE MG/DL
PROT UR-MCNC: <4 MG/DL (ref 0–15)
PROT/CREAT UR: NORMAL MG/G (ref 10–107)
RBC # BLD AUTO: 5.1 M/UL (ref 4.2–5.4)
RBC # URNS HPF: NORMAL /HPF
RBC UR QL AUTO: ABNORMAL
SODIUM SERPL-SCNC: 138 MMOL/L (ref 135–145)
SP GR UR STRIP.AUTO: 1
UROBILINOGEN UR STRIP.AUTO-MCNC: 0.2 MG/DL
WBC # BLD AUTO: 9.6 K/UL (ref 4.8–10.8)
WBC #/AREA URNS HPF: NORMAL /HPF

## 2020-09-04 PROCEDURE — 36415 COLL VENOUS BLD VENIPUNCTURE: CPT

## 2020-09-04 PROCEDURE — 85652 RBC SED RATE AUTOMATED: CPT

## 2020-09-04 PROCEDURE — 86039 ANTINUCLEAR ANTIBODIES (ANA): CPT

## 2020-09-04 PROCEDURE — 86160 COMPLEMENT ANTIGEN: CPT

## 2020-09-04 PROCEDURE — 80053 COMPREHEN METABOLIC PANEL: CPT

## 2020-09-04 PROCEDURE — 86038 ANTINUCLEAR ANTIBODIES: CPT

## 2020-09-04 PROCEDURE — 85025 COMPLETE CBC W/AUTO DIFF WBC: CPT

## 2020-09-04 PROCEDURE — 86235 NUCLEAR ANTIGEN ANTIBODY: CPT | Mod: 91

## 2020-09-04 PROCEDURE — 86225 DNA ANTIBODY NATIVE: CPT

## 2020-09-04 PROCEDURE — 86140 C-REACTIVE PROTEIN: CPT

## 2020-09-04 PROCEDURE — 81001 URINALYSIS AUTO W/SCOPE: CPT

## 2020-09-05 LAB — ERYTHROCYTE [SEDIMENTATION RATE] IN BLOOD BY WESTERGREN METHOD: 1 MM/HOUR (ref 0–30)

## 2020-09-07 LAB
DSDNA AB TITR SER CLIF: NORMAL {TITER}
ENA SM IGG SER-ACNC: 3 AU/ML (ref 0–40)
ENA SS-B IGG SER IA-ACNC: 18 AU/ML (ref 0–40)
NUCLEAR IGG SER QL IA: DETECTED
SSA52 R0ENA AB IGG Q0420: 1 AU/ML (ref 0–40)
SSA60 R0ENA AB IGG Q0419: 1 AU/ML (ref 0–40)
U1 SNRNP IGG SER QL: 7 AU/ML (ref 0–40)

## 2020-09-08 LAB
ANA INTERPRETIVE COMMENT Q5143: ABNORMAL
ANA PATTERN Q5144: ABNORMAL
ANA TITER Q5145: ABNORMAL
ANTINUCLEAR ANTIBODY (ANA), HEP-2, IGG Q5142: DETECTED

## 2021-01-11 ENCOUNTER — HOSPITAL ENCOUNTER (OUTPATIENT)
Dept: LAB | Facility: MEDICAL CENTER | Age: 56
End: 2021-01-11
Attending: INTERNAL MEDICINE
Payer: COMMERCIAL

## 2021-01-11 LAB
ALBUMIN SERPL BCP-MCNC: 4.3 G/DL (ref 3.2–4.9)
ALBUMIN/GLOB SERPL: 1.4 G/DL
ALP SERPL-CCNC: 75 U/L (ref 30–99)
ALT SERPL-CCNC: 18 U/L (ref 2–50)
ANION GAP SERPL CALC-SCNC: 9 MMOL/L (ref 7–16)
AST SERPL-CCNC: 15 U/L (ref 12–45)
BASOPHILS # BLD AUTO: 0.8 % (ref 0–1.8)
BASOPHILS # BLD: 0.07 K/UL (ref 0–0.12)
BILIRUB SERPL-MCNC: 0.4 MG/DL (ref 0.1–1.5)
BUN SERPL-MCNC: 17 MG/DL (ref 8–22)
C3 SERPL-MCNC: 144.3 MG/DL (ref 87–200)
C4 SERPL-MCNC: 25.2 MG/DL (ref 19–52)
CALCIUM SERPL-MCNC: 9.8 MG/DL (ref 8.5–10.5)
CHLORIDE SERPL-SCNC: 109 MMOL/L (ref 96–112)
CO2 SERPL-SCNC: 25 MMOL/L (ref 20–33)
CREAT SERPL-MCNC: 0.72 MG/DL (ref 0.5–1.4)
CREAT UR-MCNC: 102.26 MG/DL
CRP SERPL HS-MCNC: 0.25 MG/DL (ref 0–0.75)
EOSINOPHIL # BLD AUTO: 0.08 K/UL (ref 0–0.51)
EOSINOPHIL NFR BLD: 0.9 % (ref 0–6.9)
ERYTHROCYTE [DISTWIDTH] IN BLOOD BY AUTOMATED COUNT: 42.7 FL (ref 35.9–50)
ERYTHROCYTE [SEDIMENTATION RATE] IN BLOOD BY WESTERGREN METHOD: 5 MM/HOUR (ref 0–30)
GLOBULIN SER CALC-MCNC: 3.1 G/DL (ref 1.9–3.5)
GLUCOSE SERPL-MCNC: 84 MG/DL (ref 65–99)
HCT VFR BLD AUTO: 51.6 % (ref 37–47)
HGB BLD-MCNC: 16.9 G/DL (ref 12–16)
IMM GRANULOCYTES # BLD AUTO: 0.05 K/UL (ref 0–0.11)
IMM GRANULOCYTES NFR BLD AUTO: 0.6 % (ref 0–0.9)
LYMPHOCYTES # BLD AUTO: 1.66 K/UL (ref 1–4.8)
LYMPHOCYTES NFR BLD: 18.9 % (ref 22–41)
MCH RBC QN AUTO: 29.9 PG (ref 27–33)
MCHC RBC AUTO-ENTMCNC: 32.8 G/DL (ref 33.6–35)
MCV RBC AUTO: 91.2 FL (ref 81.4–97.8)
MONOCYTES # BLD AUTO: 0.4 K/UL (ref 0–0.85)
MONOCYTES NFR BLD AUTO: 4.6 % (ref 0–13.4)
NEUTROPHILS # BLD AUTO: 6.51 K/UL (ref 2–7.15)
NEUTROPHILS NFR BLD: 74.2 % (ref 44–72)
NRBC # BLD AUTO: 0 K/UL
NRBC BLD-RTO: 0 /100 WBC
PLATELET # BLD AUTO: 344 K/UL (ref 164–446)
PMV BLD AUTO: 9.5 FL (ref 9–12.9)
POTASSIUM SERPL-SCNC: 4.5 MMOL/L (ref 3.6–5.5)
PROT SERPL-MCNC: 7.4 G/DL (ref 6–8.2)
PROT UR-MCNC: 23 MG/DL (ref 0–15)
PROT/CREAT UR: 225 MG/G (ref 10–107)
RBC # BLD AUTO: 5.66 M/UL (ref 4.2–5.4)
SODIUM SERPL-SCNC: 143 MMOL/L (ref 135–145)
WBC # BLD AUTO: 8.8 K/UL (ref 4.8–10.8)

## 2021-01-11 PROCEDURE — 86160 COMPLEMENT ANTIGEN: CPT

## 2021-01-11 PROCEDURE — 86140 C-REACTIVE PROTEIN: CPT

## 2021-01-11 PROCEDURE — 80053 COMPREHEN METABOLIC PANEL: CPT

## 2021-01-11 PROCEDURE — 81001 URINALYSIS AUTO W/SCOPE: CPT

## 2021-01-11 PROCEDURE — 36415 COLL VENOUS BLD VENIPUNCTURE: CPT

## 2021-01-11 PROCEDURE — 85025 COMPLETE CBC W/AUTO DIFF WBC: CPT

## 2021-01-11 PROCEDURE — 85652 RBC SED RATE AUTOMATED: CPT

## 2021-01-12 LAB
APPEARANCE UR: ABNORMAL
BACTERIA #/AREA URNS HPF: ABNORMAL /HPF
BILIRUB UR QL STRIP.AUTO: NEGATIVE
CAOX CRY #/AREA URNS HPF: ABNORMAL /HPF
COLOR UR: YELLOW
EPI CELLS #/AREA URNS HPF: ABNORMAL /HPF
GLUCOSE UR STRIP.AUTO-MCNC: NEGATIVE MG/DL
HYALINE CASTS #/AREA URNS LPF: ABNORMAL /LPF
KETONES UR STRIP.AUTO-MCNC: NEGATIVE MG/DL
LEUKOCYTE ESTERASE UR QL STRIP.AUTO: ABNORMAL
MICRO URNS: ABNORMAL
NITRITE UR QL STRIP.AUTO: NEGATIVE
PH UR STRIP.AUTO: 6 [PH] (ref 5–8)
PROT UR QL STRIP: 30 MG/DL
RBC # URNS HPF: ABNORMAL /HPF
RBC UR QL AUTO: NEGATIVE
SP GR UR STRIP.AUTO: 1.02
UROBILINOGEN UR STRIP.AUTO-MCNC: 0.2 MG/DL
WBC #/AREA URNS HPF: ABNORMAL /HPF

## 2021-05-06 ENCOUNTER — HOSPITAL ENCOUNTER (OUTPATIENT)
Facility: MEDICAL CENTER | Age: 56
End: 2021-05-06
Attending: PREVENTIVE MEDICINE
Payer: COMMERCIAL

## 2021-05-06 ENCOUNTER — EMPLOYEE HEALTH (OUTPATIENT)
Dept: OCCUPATIONAL MEDICINE | Facility: CLINIC | Age: 56
End: 2021-05-06

## 2021-05-06 ENCOUNTER — EH NON-PROVIDER (OUTPATIENT)
Dept: OCCUPATIONAL MEDICINE | Facility: CLINIC | Age: 56
End: 2021-05-06

## 2021-05-06 VITALS
HEART RATE: 97 BPM | RESPIRATION RATE: 16 BRPM | BODY MASS INDEX: 31.76 KG/M2 | DIASTOLIC BLOOD PRESSURE: 84 MMHG | HEIGHT: 64 IN | SYSTOLIC BLOOD PRESSURE: 122 MMHG | WEIGHT: 186 LBS | OXYGEN SATURATION: 96 % | TEMPERATURE: 98 F

## 2021-05-06 DIAGNOSIS — Z02.1 PRE-EMPLOYMENT HEALTH SCREENING EXAMINATION: ICD-10-CM

## 2021-05-06 DIAGNOSIS — Z02.1 PRE-EMPLOYMENT DRUG SCREENING: ICD-10-CM

## 2021-05-06 DIAGNOSIS — Z02.1 PRE-EMPLOYMENT DRUG SCREENING: Primary | ICD-10-CM

## 2021-05-06 LAB
AMP AMPHETAMINE: NORMAL
BAR BARBITURATES: NORMAL
BZO BENZODIAZEPINES: NORMAL
COC COCAINE: NORMAL
INT CON NEG: NORMAL
INT CON POS: NORMAL
MDMA ECSTASY: NORMAL
MET METHAMPHETAMINES: NORMAL
MTD METHADONE: NORMAL
OPI OPIATES: NORMAL
OXY OXYCODONE: NORMAL
PCP PHENCYCLIDINE: NORMAL
POC URINE DRUG SCREEN OCDRS: NEGATIVE
THC: NORMAL

## 2021-05-06 PROCEDURE — 86787 VARICELLA-ZOSTER ANTIBODY: CPT | Performed by: PREVENTIVE MEDICINE

## 2021-05-06 PROCEDURE — 90746 HEPB VACCINE 3 DOSE ADULT IM: CPT | Performed by: NURSE PRACTITIONER

## 2021-05-06 PROCEDURE — 90715 TDAP VACCINE 7 YRS/> IM: CPT | Performed by: NURSE PRACTITIONER

## 2021-05-06 PROCEDURE — 86762 RUBELLA ANTIBODY: CPT | Performed by: PREVENTIVE MEDICINE

## 2021-05-06 PROCEDURE — 86765 RUBEOLA ANTIBODY: CPT | Performed by: PREVENTIVE MEDICINE

## 2021-05-06 PROCEDURE — 86480 TB TEST CELL IMMUN MEASURE: CPT | Performed by: PREVENTIVE MEDICINE

## 2021-05-06 PROCEDURE — 80305 DRUG TEST PRSMV DIR OPT OBS: CPT | Performed by: PREVENTIVE MEDICINE

## 2021-05-06 PROCEDURE — 86735 MUMPS ANTIBODY: CPT | Performed by: PREVENTIVE MEDICINE

## 2021-05-06 ASSESSMENT — VISUAL ACUITY
OS_CC: 20/20
OD_CC: 20/25

## 2021-05-06 ASSESSMENT — FIBROSIS 4 INDEX: FIB4 SCORE: 0.54

## 2021-05-07 LAB
GAMMA INTERFERON BACKGROUND BLD IA-ACNC: 0.03 IU/ML
M TB IFN-G BLD-IMP: NEGATIVE
M TB IFN-G CD4+ BCKGRND COR BLD-ACNC: -0.01 IU/ML
MEV IGG SER IA-ACNC: 0.61
MITOGEN IGNF BCKGRD COR BLD-ACNC: >10 IU/ML
MUV IGG SER IA-ACNC: 0.25
QFT TB2 - NIL TBQ2: -0.01 IU/ML
RUBV AB SER QL: 187 IU/ML
VZV IGG SER IA-ACNC: 2.39

## 2021-05-10 ENCOUNTER — TELEPHONE (OUTPATIENT)
Dept: OCCUPATIONAL MEDICINE | Facility: CLINIC | Age: 56
End: 2021-05-10

## 2021-05-10 ENCOUNTER — HOSPITAL ENCOUNTER (OUTPATIENT)
Dept: LAB | Facility: MEDICAL CENTER | Age: 56
End: 2021-05-10
Attending: INTERNAL MEDICINE
Payer: COMMERCIAL

## 2021-05-10 LAB
ALBUMIN SERPL BCP-MCNC: 4.1 G/DL (ref 3.2–4.9)
ALBUMIN/GLOB SERPL: 1.5 G/DL
ALP SERPL-CCNC: 68 U/L (ref 30–99)
ALT SERPL-CCNC: 14 U/L (ref 2–50)
ANION GAP SERPL CALC-SCNC: 13 MMOL/L (ref 7–16)
APPEARANCE UR: CLEAR
AST SERPL-CCNC: 19 U/L (ref 12–45)
BACTERIA #/AREA URNS HPF: NEGATIVE /HPF
BASOPHILS # BLD AUTO: 0.8 % (ref 0–1.8)
BASOPHILS # BLD: 0.06 K/UL (ref 0–0.12)
BILIRUB SERPL-MCNC: 0.4 MG/DL (ref 0.1–1.5)
BILIRUB UR QL STRIP.AUTO: NEGATIVE
BUN SERPL-MCNC: 17 MG/DL (ref 8–22)
C3 SERPL-MCNC: 139.1 MG/DL (ref 87–200)
C4 SERPL-MCNC: 24.6 MG/DL (ref 19–52)
CALCIUM SERPL-MCNC: 9 MG/DL (ref 8.5–10.5)
CHLORIDE SERPL-SCNC: 102 MMOL/L (ref 96–112)
CO2 SERPL-SCNC: 22 MMOL/L (ref 20–33)
COLOR UR: YELLOW
CREAT SERPL-MCNC: 0.79 MG/DL (ref 0.5–1.4)
CRP SERPL HS-MCNC: 1.44 MG/DL (ref 0–0.75)
EOSINOPHIL # BLD AUTO: 0.12 K/UL (ref 0–0.51)
EOSINOPHIL NFR BLD: 1.5 % (ref 0–6.9)
EPI CELLS #/AREA URNS HPF: NEGATIVE /HPF
ERYTHROCYTE [DISTWIDTH] IN BLOOD BY AUTOMATED COUNT: 46 FL (ref 35.9–50)
ERYTHROCYTE [SEDIMENTATION RATE] IN BLOOD BY WESTERGREN METHOD: 7 MM/HOUR (ref 0–25)
FASTING STATUS PATIENT QL REPORTED: NORMAL
GLOBULIN SER CALC-MCNC: 2.8 G/DL (ref 1.9–3.5)
GLUCOSE SERPL-MCNC: 68 MG/DL (ref 65–99)
GLUCOSE UR STRIP.AUTO-MCNC: NEGATIVE MG/DL
HCT VFR BLD AUTO: 46.6 % (ref 37–47)
HGB BLD-MCNC: 15.5 G/DL (ref 12–16)
HYALINE CASTS #/AREA URNS LPF: ABNORMAL /LPF
IMM GRANULOCYTES # BLD AUTO: 0.06 K/UL (ref 0–0.11)
IMM GRANULOCYTES NFR BLD AUTO: 0.8 % (ref 0–0.9)
KETONES UR STRIP.AUTO-MCNC: NEGATIVE MG/DL
LEUKOCYTE ESTERASE UR QL STRIP.AUTO: ABNORMAL
LYMPHOCYTES # BLD AUTO: 1.5 K/UL (ref 1–4.8)
LYMPHOCYTES NFR BLD: 19.1 % (ref 22–41)
MAGNESIUM SERPL-MCNC: 2.2 MG/DL (ref 1.5–2.5)
MCH RBC QN AUTO: 31.5 PG (ref 27–33)
MCHC RBC AUTO-ENTMCNC: 33.3 G/DL (ref 33.6–35)
MCV RBC AUTO: 94.7 FL (ref 81.4–97.8)
MICRO URNS: ABNORMAL
MONOCYTES # BLD AUTO: 0.52 K/UL (ref 0–0.85)
MONOCYTES NFR BLD AUTO: 6.6 % (ref 0–13.4)
NEUTROPHILS # BLD AUTO: 5.61 K/UL (ref 2–7.15)
NEUTROPHILS NFR BLD: 71.2 % (ref 44–72)
NITRITE UR QL STRIP.AUTO: NEGATIVE
NRBC # BLD AUTO: 0 K/UL
NRBC BLD-RTO: 0 /100 WBC
PH UR STRIP.AUTO: 7 [PH] (ref 5–8)
PLATELET # BLD AUTO: 370 K/UL (ref 164–446)
PMV BLD AUTO: 9.3 FL (ref 9–12.9)
POTASSIUM SERPL-SCNC: 3.6 MMOL/L (ref 3.6–5.5)
PROT SERPL-MCNC: 6.9 G/DL (ref 6–8.2)
PROT UR QL STRIP: NEGATIVE MG/DL
RBC # BLD AUTO: 4.92 M/UL (ref 4.2–5.4)
RBC # URNS HPF: ABNORMAL /HPF
RBC UR QL AUTO: NEGATIVE
SODIUM SERPL-SCNC: 137 MMOL/L (ref 135–145)
SP GR UR STRIP.AUTO: 1
UROBILINOGEN UR STRIP.AUTO-MCNC: 0.2 MG/DL
WBC # BLD AUTO: 7.9 K/UL (ref 4.8–10.8)
WBC #/AREA URNS HPF: ABNORMAL /HPF

## 2021-05-10 PROCEDURE — 83735 ASSAY OF MAGNESIUM: CPT

## 2021-05-10 PROCEDURE — 85025 COMPLETE CBC W/AUTO DIFF WBC: CPT

## 2021-05-10 PROCEDURE — 84156 ASSAY OF PROTEIN URINE: CPT

## 2021-05-10 PROCEDURE — 86140 C-REACTIVE PROTEIN: CPT

## 2021-05-10 PROCEDURE — 86160 COMPLEMENT ANTIGEN: CPT | Mod: 91

## 2021-05-10 PROCEDURE — 82570 ASSAY OF URINE CREATININE: CPT

## 2021-05-10 PROCEDURE — 85652 RBC SED RATE AUTOMATED: CPT

## 2021-05-10 PROCEDURE — 36415 COLL VENOUS BLD VENIPUNCTURE: CPT

## 2021-05-10 PROCEDURE — 87077 CULTURE AEROBIC IDENTIFY: CPT

## 2021-05-10 PROCEDURE — 80053 COMPREHEN METABOLIC PANEL: CPT

## 2021-05-10 PROCEDURE — 87086 URINE CULTURE/COLONY COUNT: CPT

## 2021-05-10 PROCEDURE — 81001 URINALYSIS AUTO W/SCOPE: CPT

## 2021-05-10 NOTE — TELEPHONE ENCOUNTER
Phone Number Called: [phone] (home)       Call outcome: Did not leave a detailed message. Requested patient to call back.    Message: Unable to leave a detailed VM, MMR titers are non immune. Vaccine is required at this time to clear for HR. An apt is required to be made asap.

## 2021-05-11 LAB
CREAT UR-MCNC: 18.57 MG/DL
PROT UR-MCNC: 4 MG/DL (ref 0–15)
PROT/CREAT UR: 215 MG/G (ref 10–107)

## 2021-05-13 ENCOUNTER — HOSPITAL ENCOUNTER (OUTPATIENT)
Dept: RADIOLOGY | Facility: MEDICAL CENTER | Age: 56
End: 2021-05-13
Attending: OBSTETRICS & GYNECOLOGY
Payer: COMMERCIAL

## 2021-05-13 ENCOUNTER — EH NON-PROVIDER (OUTPATIENT)
Dept: OCCUPATIONAL MEDICINE | Facility: CLINIC | Age: 56
End: 2021-05-13

## 2021-05-13 DIAGNOSIS — Z12.31 VISIT FOR SCREENING MAMMOGRAM: ICD-10-CM

## 2021-05-13 DIAGNOSIS — R92.30 DENSE BREAST TISSUE ON MAMMOGRAM: ICD-10-CM

## 2021-05-13 DIAGNOSIS — Z23 NEED FOR MMR VACCINE: Primary | ICD-10-CM

## 2021-05-13 LAB
BACTERIA UR CULT: ABNORMAL
BACTERIA UR CULT: ABNORMAL
SIGNIFICANT IND 70042: ABNORMAL
SITE SITE: ABNORMAL
SOURCE SOURCE: ABNORMAL

## 2021-05-13 PROCEDURE — 76641 ULTRASOUND BREAST COMPLETE: CPT

## 2021-05-13 PROCEDURE — 90707 MMR VACCINE SC: CPT | Performed by: NURSE PRACTITIONER

## 2021-05-13 NOTE — NON-PROVIDER
Pt stated that she spoke to her rheumatologist that she could have the MMR live vaccines. She is no longer taking the medication and is cleared to do so. Per  Dr. Garibay she is ok to receive the vaccines.

## 2021-05-14 ENCOUNTER — EH NON-PROVIDER (OUTPATIENT)
Dept: OCCUPATIONAL MEDICINE | Facility: CLINIC | Age: 56
End: 2021-05-14

## 2021-05-14 DIAGNOSIS — Z71.85 IMMUNIZATION COUNSELING: ICD-10-CM

## 2021-05-15 ENCOUNTER — HOSPITAL ENCOUNTER (EMERGENCY)
Facility: MEDICAL CENTER | Age: 56
End: 2021-05-15
Attending: EMERGENCY MEDICINE
Payer: COMMERCIAL

## 2021-05-15 VITALS
TEMPERATURE: 97 F | OXYGEN SATURATION: 99 % | HEART RATE: 92 BPM | RESPIRATION RATE: 20 BRPM | BODY MASS INDEX: 31.62 KG/M2 | HEIGHT: 64 IN | SYSTOLIC BLOOD PRESSURE: 140 MMHG | WEIGHT: 185.19 LBS | DIASTOLIC BLOOD PRESSURE: 102 MMHG

## 2021-05-15 DIAGNOSIS — G43.809 OTHER MIGRAINE WITHOUT STATUS MIGRAINOSUS, NOT INTRACTABLE: ICD-10-CM

## 2021-05-15 PROCEDURE — 99284 EMERGENCY DEPT VISIT MOD MDM: CPT

## 2021-05-15 PROCEDURE — A9270 NON-COVERED ITEM OR SERVICE: HCPCS

## 2021-05-15 PROCEDURE — 700111 HCHG RX REV CODE 636 W/ 250 OVERRIDE (IP): Performed by: EMERGENCY MEDICINE

## 2021-05-15 PROCEDURE — 700102 HCHG RX REV CODE 250 W/ 637 OVERRIDE(OP)

## 2021-05-15 PROCEDURE — 96375 TX/PRO/DX INJ NEW DRUG ADDON: CPT

## 2021-05-15 PROCEDURE — 96374 THER/PROPH/DIAG INJ IV PUSH: CPT

## 2021-05-15 PROCEDURE — 36415 COLL VENOUS BLD VENIPUNCTURE: CPT

## 2021-05-15 RX ORDER — METOCLOPRAMIDE 10 MG/1
10 TABLET ORAL 4 TIMES DAILY PRN
Qty: 20 TABLET | Refills: 0 | Status: SHIPPED | OUTPATIENT
Start: 2021-05-15

## 2021-05-15 RX ORDER — MORPHINE SULFATE 4 MG/ML
4 INJECTION, SOLUTION INTRAMUSCULAR; INTRAVENOUS ONCE
Status: COMPLETED | OUTPATIENT
Start: 2021-05-15 | End: 2021-05-15

## 2021-05-15 RX ORDER — KETOROLAC TROMETHAMINE 30 MG/ML
15 INJECTION, SOLUTION INTRAMUSCULAR; INTRAVENOUS ONCE
Status: COMPLETED | OUTPATIENT
Start: 2021-05-15 | End: 2021-05-15

## 2021-05-15 RX ORDER — DEXAMETHASONE 4 MG/1
4 TABLET ORAL ONCE
Status: COMPLETED | OUTPATIENT
Start: 2021-05-15 | End: 2021-05-15

## 2021-05-15 RX ORDER — METOCLOPRAMIDE HYDROCHLORIDE 5 MG/ML
10 INJECTION INTRAMUSCULAR; INTRAVENOUS ONCE
Status: COMPLETED | OUTPATIENT
Start: 2021-05-15 | End: 2021-05-15

## 2021-05-15 RX ORDER — DEXAMETHASONE SODIUM PHOSPHATE 4 MG/ML
4 INJECTION, SOLUTION INTRA-ARTICULAR; INTRALESIONAL; INTRAMUSCULAR; INTRAVENOUS; SOFT TISSUE ONCE
Status: DISCONTINUED | OUTPATIENT
Start: 2021-05-15 | End: 2021-05-15

## 2021-05-15 RX ADMIN — DEXAMETHASONE 4 MG: 4 TABLET ORAL at 17:32

## 2021-05-15 RX ADMIN — MORPHINE SULFATE 4 MG: 4 INJECTION INTRAVENOUS at 15:42

## 2021-05-15 RX ADMIN — KETOROLAC TROMETHAMINE 15 MG: 30 INJECTION, SOLUTION INTRAMUSCULAR; INTRAVENOUS at 16:10

## 2021-05-15 RX ADMIN — METOCLOPRAMIDE 10 MG: 5 INJECTION, SOLUTION INTRAMUSCULAR; INTRAVENOUS at 15:18

## 2021-05-15 ASSESSMENT — FIBROSIS 4 INDEX: FIB4 SCORE: 0.73

## 2021-05-15 NOTE — ED NOTES
"1435 Assessment done Pt reports this is \" typical complex migraine for her \" Tired looking Denies recent trauma or fever  1440 NSL initiated Blood to lab prn  1449 Report off to Jian RICHARDSON  "

## 2021-05-15 NOTE — ED PROVIDER NOTES
ED Provider Note    CHIEF COMPLAINT  Chief Complaint   Patient presents with   • Migraine   • Light Sensitivity       HPI  Eveline Galo is a 55 y.o. female who presents with headache.  She has a history of migraine and she was diagnosed with complex migraines last summer she gets some and groups.  This migraine started on Wednesday's been persistent for 4 days now medications are not helping she has severe nausea but no vomiting last night she developed her aura as well with some blurred vision that she gets with her complex migraines.  She was told to come into the ER.  She has no cough runny nose sore throat no weakness numbness or tingling no other vision changes no fever no chills no cough runny nose sore throat all other systems are negative    REVIEW OF SYSTEMS  See HPI for further details    PAST MEDICAL HISTORY  Past Medical History:   Diagnosis Date   • Lupus (HCC) 2010   • Migraine        FAMILY HISTORY  Family History   Problem Relation Age of Onset   • Cancer Mother         Brain CA and Lung CA   • Cancer Father    • No Known Problems Son    • No Known Problems Son    • No Known Problems Son        SOCIAL HISTORY  Social History     Socioeconomic History   • Marital status:      Spouse name: Not on file   • Number of children: Not on file   • Years of education: Not on file   • Highest education level: Not on file   Occupational History   • Not on file   Tobacco Use   • Smoking status: Never Smoker   • Smokeless tobacco: Never Used   Substance and Sexual Activity   • Alcohol use: Yes     Comment: rarely   • Drug use: Never   • Sexual activity: Yes     Partners: Male     Comment: .    Other Topics Concern   • Not on file   Social History Narrative   • Not on file     Social Determinants of Health     Financial Resource Strain:    • Difficulty of Paying Living Expenses:    Food Insecurity:    • Worried About Running Out of Food in the Last Year:    • Ran Out of Food in the Last Year:   "  Transportation Needs:    • Lack of Transportation (Medical):    • Lack of Transportation (Non-Medical):    Physical Activity:    • Days of Exercise per Week:    • Minutes of Exercise per Session:    Stress:    • Feeling of Stress :    Social Connections:    • Frequency of Communication with Friends and Family:    • Frequency of Social Gatherings with Friends and Family:    • Attends [employer] Services:    • Active Member of Clubs or Organizations:    • Attends Club or Organization Meetings:    • Marital Status:    Intimate Partner Violence:    • Fear of Current or Ex-Partner:    • Emotionally Abused:    • Physically Abused:    • Sexually Abused:        SURGICAL HISTORY  History reviewed. No pertinent surgical history.    CURRENT MEDICATIONS  Home Medications    **Home medications have not yet been reviewed for this encounter**         ALLERGIES  Allergies   Allergen Reactions   • Food      coconut    • Galcanezumab-Gnlm    • Keflex    • Penicillins    • Sulfa Drugs        PHYSICAL EXAM  VITAL SIGNS: /102   Pulse 92   Temp 36.1 °C (97 °F) (Temporal)   Resp 20   Ht 1.626 m (5' 4\")   Wt 84 kg (185 lb 3 oz)   LMP 04/15/2021 (Approximate)   SpO2 99%   BMI 31.79 kg/m²     Constitutional: Patient is alert and oriented x3 in moderate distress   HENT: Moist mucous membranes  Eyes:     Neck: Trach is midline  Lymphatic:   Cardiovascular: Normal heart rate    Thorax & Lungs: Clear to auscultation  Abdomen: Nontender  Neurologic: Motor is 5/5 normal sensation finger-to-nose testing  Extremities: No edema  Psychiatric: Affect normal, Judgment normal, Mood normal.           COURSE & MEDICAL DECISION MAKING  Pertinent Labs & Imaging studies reviewed. (See chart for details)  Patient's has a similar headache to the past.  She was given Reglan with no relief and then given Toradol and morphine with significant but not complete improvement.  She is no longer in severe distress.  I am given her dose of Decadron to " hopefully prevent recurrence.  I written her a dose of 20 mg Reglan for treatment at home she given return precautions and follow-up    FINAL IMPRESSION  1.   2.    1. Other migraine without status migrainosus, not intractable         3.         Electronically signed by: Leandro Huston M.D., 5/15/2021 2:54 PM

## 2021-05-15 NOTE — ED TRIAGE NOTES
"Presents accompanied by her .  Pt reports a hx of \"complex migraines.\"  She C/O severe headaches with nausea, and light sensitivity recurring for the past 4 days.     Chief Complaint   Patient presents with   • Migraine   • Light Sensitivity   /102   Pulse 92   Temp 36.1 °C (97 °F) (Temporal)   Ht 1.626 m (5' 4\")   Wt 84 kg (185 lb 3 oz)   LMP 04/15/2021 (Approximate)   SpO2 99%   BMI 31.79 kg/m²        "

## 2021-05-22 ENCOUNTER — APPOINTMENT (OUTPATIENT)
Dept: RADIOLOGY | Facility: MEDICAL CENTER | Age: 56
End: 2021-05-22
Attending: OBSTETRICS & GYNECOLOGY
Payer: COMMERCIAL

## 2021-06-12 ENCOUNTER — HOSPITAL ENCOUNTER (OUTPATIENT)
Dept: RADIOLOGY | Facility: MEDICAL CENTER | Age: 56
End: 2021-06-12
Attending: OBSTETRICS & GYNECOLOGY
Payer: COMMERCIAL

## 2021-06-12 DIAGNOSIS — Z12.31 VISIT FOR SCREENING MAMMOGRAM: ICD-10-CM

## 2021-06-12 DIAGNOSIS — R92.30 DENSE BREAST TISSUE ON MAMMOGRAM: ICD-10-CM

## 2021-06-12 PROCEDURE — 77063 BREAST TOMOSYNTHESIS BI: CPT

## 2021-06-16 ENCOUNTER — EH NON-PROVIDER (OUTPATIENT)
Dept: OCCUPATIONAL MEDICINE | Facility: CLINIC | Age: 56
End: 2021-06-16

## 2021-06-16 DIAGNOSIS — Z23 NEED FOR VACCINATION: Primary | ICD-10-CM

## 2021-06-16 PROCEDURE — 90707 MMR VACCINE SC: CPT | Performed by: NURSE PRACTITIONER

## 2021-06-16 PROCEDURE — 90746 HEPB VACCINE 3 DOSE ADULT IM: CPT | Performed by: NURSE PRACTITIONER

## 2021-09-13 ENCOUNTER — HOSPITAL ENCOUNTER (EMERGENCY)
Facility: MEDICAL CENTER | Age: 56
End: 2021-09-13
Attending: EMERGENCY MEDICINE
Payer: COMMERCIAL

## 2021-09-13 VITALS
TEMPERATURE: 98.3 F | WEIGHT: 190.48 LBS | HEIGHT: 64 IN | HEART RATE: 81 BPM | DIASTOLIC BLOOD PRESSURE: 79 MMHG | RESPIRATION RATE: 18 BRPM | BODY MASS INDEX: 32.52 KG/M2 | SYSTOLIC BLOOD PRESSURE: 117 MMHG | OXYGEN SATURATION: 93 %

## 2021-09-13 DIAGNOSIS — J01.90 ACUTE NON-RECURRENT SINUSITIS, UNSPECIFIED LOCATION: ICD-10-CM

## 2021-09-13 LAB
SARS-COV-2 RNA RESP QL NAA+PROBE: DETECTED
SPECIMEN SOURCE: ABNORMAL

## 2021-09-13 PROCEDURE — U0005 INFEC AGEN DETEC AMPLI PROBE: HCPCS

## 2021-09-13 PROCEDURE — 96374 THER/PROPH/DIAG INJ IV PUSH: CPT

## 2021-09-13 PROCEDURE — U0003 INFECTIOUS AGENT DETECTION BY NUCLEIC ACID (DNA OR RNA); SEVERE ACUTE RESPIRATORY SYNDROME CORONAVIRUS 2 (SARS-COV-2) (CORONAVIRUS DISEASE [COVID-19]), AMPLIFIED PROBE TECHNIQUE, MAKING USE OF HIGH THROUGHPUT TECHNOLOGIES AS DESCRIBED BY CMS-2020-01-R: HCPCS

## 2021-09-13 PROCEDURE — 99284 EMERGENCY DEPT VISIT MOD MDM: CPT

## 2021-09-13 PROCEDURE — 96375 TX/PRO/DX INJ NEW DRUG ADDON: CPT

## 2021-09-13 PROCEDURE — 700111 HCHG RX REV CODE 636 W/ 250 OVERRIDE (IP): Performed by: EMERGENCY MEDICINE

## 2021-09-13 RX ORDER — PROCHLORPERAZINE EDISYLATE 5 MG/ML
10 INJECTION INTRAMUSCULAR; INTRAVENOUS ONCE
Status: COMPLETED | OUTPATIENT
Start: 2021-09-13 | End: 2021-09-13

## 2021-09-13 RX ORDER — FLUTICASONE PROPIONATE 50 MCG
1 SPRAY, SUSPENSION (ML) NASAL DAILY
Qty: 16 G | Refills: 0 | Status: SHIPPED | OUTPATIENT
Start: 2021-09-13

## 2021-09-13 RX ORDER — DIPHENHYDRAMINE HYDROCHLORIDE 50 MG/ML
25 INJECTION INTRAMUSCULAR; INTRAVENOUS ONCE
Status: COMPLETED | OUTPATIENT
Start: 2021-09-13 | End: 2021-09-13

## 2021-09-13 RX ORDER — DOXYCYCLINE 100 MG/1
100 CAPSULE ORAL 2 TIMES DAILY
Qty: 14 CAPSULE | Refills: 0 | Status: SHIPPED | OUTPATIENT
Start: 2021-09-13 | End: 2021-09-20

## 2021-09-13 RX ORDER — DEXAMETHASONE SODIUM PHOSPHATE 4 MG/ML
12 INJECTION, SOLUTION INTRA-ARTICULAR; INTRALESIONAL; INTRAMUSCULAR; INTRAVENOUS; SOFT TISSUE ONCE
Status: COMPLETED | OUTPATIENT
Start: 2021-09-13 | End: 2021-09-13

## 2021-09-13 RX ADMIN — PROCHLORPERAZINE EDISYLATE 10 MG: 5 INJECTION INTRAMUSCULAR; INTRAVENOUS at 07:43

## 2021-09-13 RX ADMIN — DIPHENHYDRAMINE HYDROCHLORIDE 25 MG: 50 INJECTION INTRAMUSCULAR; INTRAVENOUS at 07:44

## 2021-09-13 RX ADMIN — DEXAMETHASONE SODIUM PHOSPHATE 12 MG: 4 INJECTION, SOLUTION INTRA-ARTICULAR; INTRALESIONAL; INTRAMUSCULAR; INTRAVENOUS; SOFT TISSUE at 07:40

## 2021-09-13 ASSESSMENT — FIBROSIS 4 INDEX
FIB4 SCORE: 0.74
FIB4 SCORE: 0.74

## 2021-09-13 NOTE — ED PROVIDER NOTES
ED Provider Note    CHIEF COMPLAINT  Chief Complaint   Patient presents with   • Sinus Pain     reports cough for 2 weeks, thinks she has a sinus infection   • Migraine     hx of migraine       HPI  Eveline Galo is a 56 y.o. female who presents with headache.  Patient first started getting sick about 2 weeks ago.  Has had cough, congestion, runny nose.  Was seen via telemedicine and it was thought that she had a viral illness or allergies.  Watchful waiting was advised.  Saturday with increased nasal congestion pressure runny nose and she developed a headache in the afternoon.  This described as a vice in both temples.  Gradually worsening.  No thunderclap.  She has associated photophobia.  She feels a twisting of her right eye and some spots in her vision which is consistent with previous complex migraines.  She has headaches chronically and is followed by pain management.  She tried using her home protocols without success.  She has not had a fever or neck stiffness.  No focal weakness numbness neurologic symptoms.  Positive nausea and vomiting.  Cough is nonproductive and dry.  She has no known Covid exposure.  She is not vaccinated at the recommendation of her physician because of previous allergies    Chart reviewed.  Patient has visit to ER twice in about a year for migraine.  Treated successfully with Compazine, Toradol, Benadryl.    REVIEW OF SYSTEMS  As per HPI  All other systems are reviewed and negative    PAST MEDICAL HISTORY  Past Medical History:   Diagnosis Date   • Lupus (HCC) 2010   • Migraine        FAMILY HISTORY  Family History   Problem Relation Age of Onset   • Cancer Mother         Brain CA and Lung CA   • Cancer Father    • No Known Problems Son    • No Known Problems Son    • No Known Problems Son        SOCIAL HISTORY  Social History     Tobacco Use   • Smoking status: Never Smoker   • Smokeless tobacco: Never Used   Vaping Use   • Vaping Use: Never used   Substance Use Topics   •  "Alcohol use: Yes     Comment: rarely   • Drug use: Never       SURGICAL HISTORY  History reviewed. No pertinent surgical history.    CURRENT MEDICATIONS  Home Medications    **Home medications have not yet been reviewed for this encounter**         ALLERGIES  Allergies   Allergen Reactions   • Food      coconut    • Galcanezumab-Gnlm    • Keflex    • Penicillins    • Sulfa Drugs        PHYSICAL EXAM  VITAL SIGNS: /83   Pulse 90   Temp 36.8 °C (98.3 °F) (Temporal)   Resp 18   Ht 1.626 m (5' 4\")   Wt 86.4 kg (190 lb 7.6 oz)   LMP 04/15/2021 (Approximate)   SpO2 95%   BMI 32.70 kg/m²   Constitutional: Awake and alert.  Wearing sunglasses  HENT: Head atraumatic, nares with mucosal edema bilaterally.  Left tympanic membrane appears red with middle ear effusion  Eyes: PERRL  Neck: Normal range of motion, No tenderness, Supple, No stridor.   Lymphatic: No lymphadenopathy noted.   Cardiovascular: Normal heart rate, Normal rhythm, No murmurs, No rubs, No gallops.   Thorax & Lungs: Normal breath sounds, No respiratory distress, No wheezing, No chest tenderness.   Skin: Warm, Dry, No erythema, No rash.   Extremities: Intact distal pulses, No edema, No tenderness, No cyanosis, No clubbing.   Neurologic: Alert & oriented x 3, Normal motor function, Normal sensory function, No focal deficits noted, normal gait  Psychiatric: Affect normal, Judgment normal, Mood normal.       Labs  Results for orders placed or performed during the hospital encounter of 09/13/21   SARS-CoV-2 PCR (24 hour In-House): Collect NP swab in VTM    Specimen: Nasopharyngeal; Respirate   Result Value Ref Range    SARS-CoV-2 Source Nasal Swab         COURSE & MEDICAL DECISION MAKING  Patient presents with headache.  No red flags.  Suspect the bulk of headache related to sinusitis.  She has had symptoms for over a week meeting criteria for treatment with antibiotics.  She did describe migraine component and was treated for such with Benadryl, " Compazine and dexamethasone.  She reported significant improvement after therapy.  I did send a Covid screen which is currently pending.  [facility]  She will return to ER for neurologic symptoms, uncontrolled symptoms or concern.    FINAL IMPRESSION  1.  Acute benign cephalgia  2.  Sinusitis  3.  Rule out COVID-19    This dictation was created using voice recognition software. The accuracy of the dictation is limited to the abilities of the software. I expect there may be some errors of grammar and possibly content. The nursing notes were reviewed and certain aspects of this information were incorporated into this note.      Electronically signed by: Jaden Thakkar M.D., 9/13/2021 6:53 AM

## 2021-09-13 NOTE — ED NOTES
Discharge instructions provided.  Pt verbalized the understanding of discharge instructions to follow up with PCP and to return to ER if condition worsens.  Pt ambulated out of ER without difficulty.   RX 2

## 2021-09-13 NOTE — ED TRIAGE NOTES
"Pt arrives in sunglasses c/o \"complex migrane.\" Is pt of [facility]; migraine started Saturday night, pt did home protocols (zofran, trocandy, etc) but headache not gone and so was directed to come in here if headache continues after 24 hours.     +light sensitivy  + n/v  "

## 2021-09-14 ENCOUNTER — TELEPHONE (OUTPATIENT)
Dept: OCCUPATIONAL MEDICINE | Facility: CLINIC | Age: 56
End: 2021-09-14

## 2021-09-14 NOTE — TELEPHONE ENCOUNTER
Phone Number Called:[phone]    Call outcome: Spoke to patient regarding message below.    Message: Patient called the employee screening line stating she was seen in the ER and tested positive for COVID-19. She states she was having symptoms over the last two weeks and believed they were allergy related. She developed new symptoms on 9/11/21 and went to the ER on 9/13. We will follow up with her on 9/21 and hopefully release her to return to work. Patient verbalized understanding.

## 2021-09-21 ENCOUNTER — TELEPHONE (OUTPATIENT)
Dept: INTENSIVE CARE | Facility: MEDICAL CENTER | Age: 56
End: 2021-09-21

## 2021-09-21 NOTE — TELEPHONE ENCOUNTER
Phone Number Called: [phone] (home)       Call outcome: Spoke to patient regarding message below.    Message: Called patient to follow up with Covid, patient states she is doing better. No fever, diarrhea or vomiting. Patient cleared to go back to work from Covid standpoint. E-mail sent to patient's manager.

## 2022-01-10 ENCOUNTER — HOSPITAL ENCOUNTER (OUTPATIENT)
Dept: LAB | Facility: MEDICAL CENTER | Age: 57
End: 2022-01-10
Attending: NURSE PRACTITIONER
Payer: COMMERCIAL

## 2022-01-10 ENCOUNTER — HOSPITAL ENCOUNTER (OUTPATIENT)
Dept: LAB | Facility: MEDICAL CENTER | Age: 57
End: 2022-01-10
Attending: INTERNAL MEDICINE
Payer: COMMERCIAL

## 2022-01-10 LAB
ALBUMIN SERPL BCP-MCNC: 4.3 G/DL (ref 3.2–4.9)
ALBUMIN/GLOB SERPL: 1.6 G/DL
ALP SERPL-CCNC: 81 U/L (ref 30–99)
ALT SERPL-CCNC: 38 U/L (ref 2–50)
ANION GAP SERPL CALC-SCNC: 13 MMOL/L (ref 7–16)
APPEARANCE UR: CLEAR
AST SERPL-CCNC: 30 U/L (ref 12–45)
BASOPHILS # BLD AUTO: 0.9 % (ref 0–1.8)
BASOPHILS # BLD: 0.06 K/UL (ref 0–0.12)
BILIRUB SERPL-MCNC: 0.3 MG/DL (ref 0.1–1.5)
BILIRUB UR QL STRIP.AUTO: NEGATIVE
BUN SERPL-MCNC: 18 MG/DL (ref 8–22)
C3 SERPL-MCNC: 139.9 MG/DL (ref 87–200)
C4 SERPL-MCNC: 26.7 MG/DL (ref 19–52)
CALCIUM SERPL-MCNC: 9.6 MG/DL (ref 8.5–10.5)
CHLORIDE SERPL-SCNC: 105 MMOL/L (ref 96–112)
CO2 SERPL-SCNC: 23 MMOL/L (ref 20–33)
COLOR UR: YELLOW
CREAT SERPL-MCNC: 0.76 MG/DL (ref 0.5–1.4)
CREAT UR-MCNC: 98.47 MG/DL
CRP SERPL HS-MCNC: 0.43 MG/DL (ref 0–0.75)
EOSINOPHIL # BLD AUTO: 0.19 K/UL (ref 0–0.51)
EOSINOPHIL NFR BLD: 3 % (ref 0–6.9)
ERYTHROCYTE [DISTWIDTH] IN BLOOD BY AUTOMATED COUNT: 44.4 FL (ref 35.9–50)
ERYTHROCYTE [SEDIMENTATION RATE] IN BLOOD BY WESTERGREN METHOD: 6 MM/HOUR (ref 0–25)
GLOBULIN SER CALC-MCNC: 2.7 G/DL (ref 1.9–3.5)
GLUCOSE SERPL-MCNC: 85 MG/DL (ref 65–99)
GLUCOSE UR STRIP.AUTO-MCNC: NEGATIVE MG/DL
HBV SURFACE AB SERPL IA-ACNC: 581 MIU/ML (ref 0–10)
HCT VFR BLD AUTO: 45.2 % (ref 37–47)
HGB BLD-MCNC: 15.3 G/DL (ref 12–16)
IMM GRANULOCYTES # BLD AUTO: 0.05 K/UL (ref 0–0.11)
IMM GRANULOCYTES NFR BLD AUTO: 0.8 % (ref 0–0.9)
KETONES UR STRIP.AUTO-MCNC: NEGATIVE MG/DL
LEUKOCYTE ESTERASE UR QL STRIP.AUTO: NEGATIVE
LYMPHOCYTES # BLD AUTO: 1.64 K/UL (ref 1–4.8)
LYMPHOCYTES NFR BLD: 25.7 % (ref 22–41)
MCH RBC QN AUTO: 30.4 PG (ref 27–33)
MCHC RBC AUTO-ENTMCNC: 33.8 G/DL (ref 33.6–35)
MCV RBC AUTO: 89.9 FL (ref 81.4–97.8)
MEV IGG SER IA-ACNC: 0.72
MICRO URNS: NORMAL
MONOCYTES # BLD AUTO: 0.5 K/UL (ref 0–0.85)
MONOCYTES NFR BLD AUTO: 7.8 % (ref 0–13.4)
MUV IGG SER IA-ACNC: 0.28
NEUTROPHILS # BLD AUTO: 3.93 K/UL (ref 2–7.15)
NEUTROPHILS NFR BLD: 61.8 % (ref 44–72)
NITRITE UR QL STRIP.AUTO: NEGATIVE
NRBC # BLD AUTO: 0 K/UL
NRBC BLD-RTO: 0 /100 WBC
PH UR STRIP.AUTO: 5.5 [PH] (ref 5–8)
PLATELET # BLD AUTO: 325 K/UL (ref 164–446)
PMV BLD AUTO: 8.9 FL (ref 9–12.9)
POTASSIUM SERPL-SCNC: 4.3 MMOL/L (ref 3.6–5.5)
PROT SERPL-MCNC: 7 G/DL (ref 6–8.2)
PROT UR QL STRIP: NEGATIVE MG/DL
PROT UR-MCNC: 7 MG/DL (ref 0–15)
PROT/CREAT UR: 71 MG/G (ref 10–107)
RBC # BLD AUTO: 5.03 M/UL (ref 4.2–5.4)
RBC UR QL AUTO: NEGATIVE
RUBV AB SER QL: >500 IU/ML
SODIUM SERPL-SCNC: 141 MMOL/L (ref 135–145)
SP GR UR STRIP.AUTO: 1.02
UROBILINOGEN UR STRIP.AUTO-MCNC: 0.2 MG/DL
WBC # BLD AUTO: 6.4 K/UL (ref 4.8–10.8)

## 2022-01-10 PROCEDURE — 86706 HEP B SURFACE ANTIBODY: CPT

## 2022-01-10 PROCEDURE — 86735 MUMPS ANTIBODY: CPT

## 2022-01-10 PROCEDURE — 86762 RUBELLA ANTIBODY: CPT

## 2022-01-10 PROCEDURE — 86765 RUBEOLA ANTIBODY: CPT

## 2022-01-10 PROCEDURE — 36415 COLL VENOUS BLD VENIPUNCTURE: CPT

## 2022-01-10 PROCEDURE — 85025 COMPLETE CBC W/AUTO DIFF WBC: CPT

## 2022-01-10 PROCEDURE — 85652 RBC SED RATE AUTOMATED: CPT

## 2022-01-10 PROCEDURE — 86160 COMPLEMENT ANTIGEN: CPT | Mod: 91

## 2022-01-10 PROCEDURE — 82570 ASSAY OF URINE CREATININE: CPT

## 2022-01-10 PROCEDURE — 86140 C-REACTIVE PROTEIN: CPT

## 2022-01-10 PROCEDURE — 81003 URINALYSIS AUTO W/O SCOPE: CPT

## 2022-01-10 PROCEDURE — 80053 COMPREHEN METABOLIC PANEL: CPT

## 2022-01-10 PROCEDURE — 84156 ASSAY OF PROTEIN URINE: CPT

## 2022-05-10 ENCOUNTER — HOSPITAL ENCOUNTER (OUTPATIENT)
Dept: LAB | Facility: MEDICAL CENTER | Age: 57
End: 2022-05-10
Attending: INTERNAL MEDICINE
Payer: COMMERCIAL

## 2022-05-10 LAB
APPEARANCE UR: CLEAR
BASOPHILS # BLD AUTO: 0.8 % (ref 0–1.8)
BASOPHILS # BLD: 0.06 K/UL (ref 0–0.12)
BILIRUB UR QL STRIP.AUTO: NEGATIVE
COLOR UR: YELLOW
CREAT UR-MCNC: 67.58 MG/DL
EOSINOPHIL # BLD AUTO: 0.12 K/UL (ref 0–0.51)
EOSINOPHIL NFR BLD: 1.5 % (ref 0–6.9)
ERYTHROCYTE [DISTWIDTH] IN BLOOD BY AUTOMATED COUNT: 47.9 FL (ref 35.9–50)
ERYTHROCYTE [SEDIMENTATION RATE] IN BLOOD BY WESTERGREN METHOD: 4 MM/HOUR (ref 0–25)
GLUCOSE UR STRIP.AUTO-MCNC: NEGATIVE MG/DL
HCT VFR BLD AUTO: 46.5 % (ref 37–47)
HGB BLD-MCNC: 16.1 G/DL (ref 12–16)
IMM GRANULOCYTES # BLD AUTO: 0.1 K/UL (ref 0–0.11)
IMM GRANULOCYTES NFR BLD AUTO: 1.3 % (ref 0–0.9)
KETONES UR STRIP.AUTO-MCNC: NEGATIVE MG/DL
LEUKOCYTE ESTERASE UR QL STRIP.AUTO: NEGATIVE
LYMPHOCYTES # BLD AUTO: 1.41 K/UL (ref 1–4.8)
LYMPHOCYTES NFR BLD: 17.8 % (ref 22–41)
MCH RBC QN AUTO: 31.6 PG (ref 27–33)
MCHC RBC AUTO-ENTMCNC: 34.6 G/DL (ref 33.6–35)
MCV RBC AUTO: 91.4 FL (ref 81.4–97.8)
MICRO URNS: NORMAL
MONOCYTES # BLD AUTO: 0.43 K/UL (ref 0–0.85)
MONOCYTES NFR BLD AUTO: 5.4 % (ref 0–13.4)
NEUTROPHILS # BLD AUTO: 5.79 K/UL (ref 2–7.15)
NEUTROPHILS NFR BLD: 73.2 % (ref 44–72)
NITRITE UR QL STRIP.AUTO: NEGATIVE
NRBC # BLD AUTO: 0 K/UL
NRBC BLD-RTO: 0 /100 WBC
PH UR STRIP.AUTO: 5.5 [PH] (ref 5–8)
PLATELET # BLD AUTO: 334 K/UL (ref 164–446)
PMV BLD AUTO: 9.2 FL (ref 9–12.9)
PROT UR QL STRIP: NEGATIVE MG/DL
PROT UR-MCNC: 4 MG/DL (ref 0–15)
PROT/CREAT UR: 59 MG/G (ref 10–107)
RBC # BLD AUTO: 5.09 M/UL (ref 4.2–5.4)
RBC UR QL AUTO: NEGATIVE
SP GR UR STRIP.AUTO: 1.02
UROBILINOGEN UR STRIP.AUTO-MCNC: 0.2 MG/DL
WBC # BLD AUTO: 7.9 K/UL (ref 4.8–10.8)

## 2022-05-10 PROCEDURE — 85652 RBC SED RATE AUTOMATED: CPT

## 2022-05-10 PROCEDURE — 86160 COMPLEMENT ANTIGEN: CPT

## 2022-05-10 PROCEDURE — 36415 COLL VENOUS BLD VENIPUNCTURE: CPT

## 2022-05-10 PROCEDURE — 84156 ASSAY OF PROTEIN URINE: CPT

## 2022-05-10 PROCEDURE — 85025 COMPLETE CBC W/AUTO DIFF WBC: CPT

## 2022-05-10 PROCEDURE — 82570 ASSAY OF URINE CREATININE: CPT

## 2022-05-10 PROCEDURE — 81003 URINALYSIS AUTO W/O SCOPE: CPT

## 2022-05-10 PROCEDURE — 86140 C-REACTIVE PROTEIN: CPT

## 2022-05-10 PROCEDURE — 80053 COMPREHEN METABOLIC PANEL: CPT

## 2022-05-11 LAB
ALBUMIN SERPL BCP-MCNC: 4.6 G/DL (ref 3.2–4.9)
ALBUMIN/GLOB SERPL: 2 G/DL
ALP SERPL-CCNC: 88 U/L (ref 30–99)
ALT SERPL-CCNC: 21 U/L (ref 2–50)
ANION GAP SERPL CALC-SCNC: 17 MMOL/L (ref 7–16)
AST SERPL-CCNC: 27 U/L (ref 12–45)
BILIRUB SERPL-MCNC: 0.3 MG/DL (ref 0.1–1.5)
BUN SERPL-MCNC: 16 MG/DL (ref 8–22)
C3 SERPL-MCNC: 130.7 MG/DL (ref 87–200)
C4 SERPL-MCNC: 24.8 MG/DL (ref 19–52)
CALCIUM SERPL-MCNC: 9.5 MG/DL (ref 8.5–10.5)
CHLORIDE SERPL-SCNC: 106 MMOL/L (ref 96–112)
CO2 SERPL-SCNC: 20 MMOL/L (ref 20–33)
CREAT SERPL-MCNC: 0.98 MG/DL (ref 0.5–1.4)
CRP SERPL HS-MCNC: 0.34 MG/DL (ref 0–0.75)
GFR SERPLBLD CREATININE-BSD FMLA CKD-EPI: 68 ML/MIN/1.73 M 2
GLOBULIN SER CALC-MCNC: 2.3 G/DL (ref 1.9–3.5)
GLUCOSE SERPL-MCNC: 82 MG/DL (ref 65–99)
POTASSIUM SERPL-SCNC: 4 MMOL/L (ref 3.6–5.5)
PROT SERPL-MCNC: 6.9 G/DL (ref 6–8.2)
SODIUM SERPL-SCNC: 143 MMOL/L (ref 135–145)

## 2022-06-15 ENCOUNTER — APPOINTMENT (OUTPATIENT)
Dept: RADIOLOGY | Facility: MEDICAL CENTER | Age: 57
End: 2022-06-15
Attending: OBSTETRICS & GYNECOLOGY
Payer: COMMERCIAL

## 2022-06-20 ENCOUNTER — HOSPITAL ENCOUNTER (OUTPATIENT)
Dept: LAB | Facility: MEDICAL CENTER | Age: 57
End: 2022-06-20
Attending: NURSE PRACTITIONER
Payer: COMMERCIAL

## 2022-06-20 LAB
CHOLEST SERPL-MCNC: 169 MG/DL (ref 100–199)
CRP SERPL HS-MCNC: 0.56 MG/DL (ref 0–0.75)
ERYTHROCYTE [SEDIMENTATION RATE] IN BLOOD BY WESTERGREN METHOD: 8 MM/HOUR (ref 0–25)
EST. AVERAGE GLUCOSE BLD GHB EST-MCNC: 94 MG/DL
ESTRADIOL SERPL-MCNC: 234 PG/ML
FASTING STATUS PATIENT QL REPORTED: NORMAL
HBA1C MFR BLD: 4.9 % (ref 4–5.6)
HDLC SERPL-MCNC: 58 MG/DL
LDLC SERPL CALC-MCNC: 90 MG/DL
NT-PROBNP SERPL IA-MCNC: 38 PG/ML (ref 0–125)
PROGEST SERPL-MCNC: 0.34 NG/ML
T4 FREE SERPL-MCNC: 1.15 NG/DL (ref 0.93–1.7)
TESTOST SERPL-MCNC: 18 NG/DL (ref 9–75)
THYROPEROXIDASE AB SERPL-ACNC: <9 IU/ML (ref 0–9)
TRIGL SERPL-MCNC: 106 MG/DL (ref 0–149)
TSH SERPL DL<=0.005 MIU/L-ACNC: 6.61 UIU/ML (ref 0.38–5.33)

## 2022-06-20 PROCEDURE — 84443 ASSAY THYROID STIM HORMONE: CPT

## 2022-06-20 PROCEDURE — 83036 HEMOGLOBIN GLYCOSYLATED A1C: CPT

## 2022-06-20 PROCEDURE — 86376 MICROSOMAL ANTIBODY EACH: CPT

## 2022-06-20 PROCEDURE — 84403 ASSAY OF TOTAL TESTOSTERONE: CPT

## 2022-06-20 PROCEDURE — 85652 RBC SED RATE AUTOMATED: CPT

## 2022-06-20 PROCEDURE — 83880 ASSAY OF NATRIURETIC PEPTIDE: CPT

## 2022-06-20 PROCEDURE — 80061 LIPID PANEL: CPT

## 2022-06-20 PROCEDURE — 82626 DEHYDROEPIANDROSTERONE: CPT

## 2022-06-20 PROCEDURE — 82670 ASSAY OF TOTAL ESTRADIOL: CPT

## 2022-06-20 PROCEDURE — 84439 ASSAY OF FREE THYROXINE: CPT

## 2022-06-20 PROCEDURE — 84144 ASSAY OF PROGESTERONE: CPT

## 2022-06-20 PROCEDURE — 86140 C-REACTIVE PROTEIN: CPT

## 2022-06-20 PROCEDURE — 36415 COLL VENOUS BLD VENIPUNCTURE: CPT

## 2022-06-30 LAB — DHEA SERPL-MCNC: 0.92 NG/ML (ref 0.63–4.7)

## 2022-09-01 ENCOUNTER — HOSPITAL ENCOUNTER (OUTPATIENT)
Dept: LAB | Facility: MEDICAL CENTER | Age: 57
End: 2022-09-01
Attending: NURSE PRACTITIONER
Payer: COMMERCIAL

## 2022-09-01 ENCOUNTER — HOSPITAL ENCOUNTER (OUTPATIENT)
Dept: LAB | Facility: MEDICAL CENTER | Age: 57
End: 2022-09-01
Attending: INTERNAL MEDICINE
Payer: COMMERCIAL

## 2022-09-01 LAB
ALBUMIN SERPL BCP-MCNC: 4.3 G/DL (ref 3.2–4.9)
ALP SERPL-CCNC: 77 U/L (ref 30–99)
ALT SERPL-CCNC: 14 U/L (ref 2–50)
APPEARANCE UR: CLEAR
AST SERPL-CCNC: 21 U/L (ref 12–45)
BASOPHILS # BLD AUTO: 0.8 % (ref 0–1.8)
BASOPHILS # BLD: 0.06 K/UL (ref 0–0.12)
BILIRUB CONJ SERPL-MCNC: <0.2 MG/DL (ref 0.1–0.5)
BILIRUB INDIRECT SERPL-MCNC: NORMAL MG/DL (ref 0–1)
BILIRUB SERPL-MCNC: 0.3 MG/DL (ref 0.1–1.5)
BILIRUB UR QL STRIP.AUTO: NEGATIVE
C3 SERPL-MCNC: 146.5 MG/DL (ref 87–200)
C4 SERPL-MCNC: 27.1 MG/DL (ref 19–52)
COLOR UR: YELLOW
CREAT SERPL-MCNC: 0.71 MG/DL (ref 0.5–1.4)
CREAT UR-MCNC: 19.93 MG/DL
CRP SERPL HS-MCNC: 0.34 MG/DL (ref 0–0.75)
EOSINOPHIL # BLD AUTO: 0.14 K/UL (ref 0–0.51)
EOSINOPHIL NFR BLD: 2 % (ref 0–6.9)
ERYTHROCYTE [DISTWIDTH] IN BLOOD BY AUTOMATED COUNT: 41.7 FL (ref 35.9–50)
ERYTHROCYTE [SEDIMENTATION RATE] IN BLOOD BY WESTERGREN METHOD: 11 MM/HOUR (ref 0–25)
GFR SERPLBLD CREATININE-BSD FMLA CKD-EPI: 100 ML/MIN/1.73 M 2
GLUCOSE UR STRIP.AUTO-MCNC: NEGATIVE MG/DL
HCT VFR BLD AUTO: 42.2 % (ref 37–47)
HGB BLD-MCNC: 14.7 G/DL (ref 12–16)
IMM GRANULOCYTES # BLD AUTO: 0.04 K/UL (ref 0–0.11)
IMM GRANULOCYTES NFR BLD AUTO: 0.6 % (ref 0–0.9)
KETONES UR STRIP.AUTO-MCNC: NEGATIVE MG/DL
LEUKOCYTE ESTERASE UR QL STRIP.AUTO: NEGATIVE
LYMPHOCYTES # BLD AUTO: 1.4 K/UL (ref 1–4.8)
LYMPHOCYTES NFR BLD: 19.8 % (ref 22–41)
MCH RBC QN AUTO: 31.1 PG (ref 27–33)
MCHC RBC AUTO-ENTMCNC: 34.8 G/DL (ref 33.6–35)
MCV RBC AUTO: 89.2 FL (ref 81.4–97.8)
MICRO URNS: NORMAL
MONOCYTES # BLD AUTO: 0.51 K/UL (ref 0–0.85)
MONOCYTES NFR BLD AUTO: 7.2 % (ref 0–13.4)
NEUTROPHILS # BLD AUTO: 4.91 K/UL (ref 2–7.15)
NEUTROPHILS NFR BLD: 69.6 % (ref 44–72)
NITRITE UR QL STRIP.AUTO: NEGATIVE
NRBC # BLD AUTO: 0 K/UL
NRBC BLD-RTO: 0 /100 WBC
PH UR STRIP.AUTO: 6 [PH] (ref 5–8)
PLATELET # BLD AUTO: 355 K/UL (ref 164–446)
PMV BLD AUTO: 9.2 FL (ref 9–12.9)
PROT SERPL-MCNC: 6.9 G/DL (ref 6–8.2)
PROT UR QL STRIP: NEGATIVE MG/DL
PROT UR-MCNC: <4 MG/DL (ref 0–15)
PROT/CREAT UR: NORMAL MG/G (ref 10–107)
RBC # BLD AUTO: 4.73 M/UL (ref 4.2–5.4)
RBC UR QL AUTO: NEGATIVE
SP GR UR STRIP.AUTO: 1.01
T4 FREE SERPL-MCNC: 1.84 NG/DL (ref 0.93–1.7)
TSH SERPL DL<=0.005 MIU/L-ACNC: 0.47 UIU/ML (ref 0.38–5.33)
UROBILINOGEN UR STRIP.AUTO-MCNC: 0.2 MG/DL
WBC # BLD AUTO: 7.1 K/UL (ref 4.8–10.8)

## 2022-09-01 PROCEDURE — 85025 COMPLETE CBC W/AUTO DIFF WBC: CPT

## 2022-09-01 PROCEDURE — 84439 ASSAY OF FREE THYROXINE: CPT

## 2022-09-01 PROCEDURE — 80076 HEPATIC FUNCTION PANEL: CPT

## 2022-09-01 PROCEDURE — 82565 ASSAY OF CREATININE: CPT

## 2022-09-01 PROCEDURE — 84443 ASSAY THYROID STIM HORMONE: CPT

## 2022-09-01 PROCEDURE — 86140 C-REACTIVE PROTEIN: CPT

## 2022-09-01 PROCEDURE — 86160 COMPLEMENT ANTIGEN: CPT | Mod: 91

## 2022-09-01 PROCEDURE — 85652 RBC SED RATE AUTOMATED: CPT

## 2022-09-01 PROCEDURE — 82570 ASSAY OF URINE CREATININE: CPT

## 2022-09-01 PROCEDURE — 81003 URINALYSIS AUTO W/O SCOPE: CPT

## 2022-09-01 PROCEDURE — 36415 COLL VENOUS BLD VENIPUNCTURE: CPT

## 2022-09-01 PROCEDURE — 84156 ASSAY OF PROTEIN URINE: CPT

## 2022-09-15 ENCOUNTER — HOSPITAL ENCOUNTER (OUTPATIENT)
Dept: RADIOLOGY | Facility: MEDICAL CENTER | Age: 57
End: 2022-09-15
Attending: OBSTETRICS & GYNECOLOGY
Payer: COMMERCIAL

## 2022-09-15 DIAGNOSIS — Z12.31 VISIT FOR SCREENING MAMMOGRAM: ICD-10-CM

## 2022-09-15 DIAGNOSIS — R92.30 BREAST DENSITY: ICD-10-CM

## 2022-09-15 PROCEDURE — 77063 BREAST TOMOSYNTHESIS BI: CPT

## 2022-09-15 PROCEDURE — 76641 ULTRASOUND BREAST COMPLETE: CPT

## 2022-12-01 ENCOUNTER — HOSPITAL ENCOUNTER (OUTPATIENT)
Dept: LAB | Facility: MEDICAL CENTER | Age: 57
End: 2022-12-01
Attending: NURSE PRACTITIONER
Payer: COMMERCIAL

## 2022-12-01 PROCEDURE — 84443 ASSAY THYROID STIM HORMONE: CPT

## 2022-12-01 PROCEDURE — 36415 COLL VENOUS BLD VENIPUNCTURE: CPT

## 2022-12-01 PROCEDURE — 84439 ASSAY OF FREE THYROXINE: CPT

## 2022-12-02 LAB
T4 FREE SERPL-MCNC: 1.96 NG/DL (ref 0.93–1.7)
TSH SERPL DL<=0.005 MIU/L-ACNC: 0.25 UIU/ML (ref 0.38–5.33)

## 2023-02-01 ENCOUNTER — HOSPITAL ENCOUNTER (OUTPATIENT)
Dept: LAB | Facility: MEDICAL CENTER | Age: 58
End: 2023-02-01
Attending: NURSE PRACTITIONER
Payer: COMMERCIAL

## 2023-02-01 LAB
T4 FREE SERPL-MCNC: 1.57 NG/DL (ref 0.93–1.7)
TSH SERPL DL<=0.005 MIU/L-ACNC: 0.23 UIU/ML (ref 0.38–5.33)

## 2023-02-01 PROCEDURE — 84439 ASSAY OF FREE THYROXINE: CPT

## 2023-02-01 PROCEDURE — 84443 ASSAY THYROID STIM HORMONE: CPT

## 2023-02-01 PROCEDURE — 36415 COLL VENOUS BLD VENIPUNCTURE: CPT

## 2023-02-14 ENCOUNTER — HOSPITAL ENCOUNTER (OUTPATIENT)
Facility: MEDICAL CENTER | Age: 58
End: 2023-02-14
Attending: NURSE PRACTITIONER
Payer: COMMERCIAL

## 2023-02-14 PROCEDURE — 87328 CRYPTOSPORIDIUM AG IA: CPT

## 2023-02-14 PROCEDURE — 87329 GIARDIA AG IA: CPT

## 2023-02-15 ENCOUNTER — HOSPITAL ENCOUNTER (OUTPATIENT)
Facility: MEDICAL CENTER | Age: 58
End: 2023-02-15
Attending: NURSE PRACTITIONER
Payer: COMMERCIAL

## 2023-02-15 LAB
G LAMBLIA+C PARVUM AG STL QL RAPID: NORMAL
SIGNIFICANT IND 70042: NORMAL
SITE SITE: NORMAL
SOURCE SOURCE: NORMAL

## 2023-02-15 PROCEDURE — 87045 FECES CULTURE AEROBIC BACT: CPT

## 2023-02-15 PROCEDURE — 87899 AGENT NOS ASSAY W/OPTIC: CPT | Mod: 91

## 2023-02-15 PROCEDURE — 83630 LACTOFERRIN FECAL (QUAL): CPT

## 2023-02-15 PROCEDURE — 83993 ASSAY FOR CALPROTECTIN FECAL: CPT

## 2023-02-16 LAB — LACTOFERRIN STL QL IA: NEGATIVE

## 2023-02-17 LAB
E COLI SXT1+2 STL IA: NORMAL
SIGNIFICANT IND 70042: NORMAL
SITE SITE: NORMAL
SOURCE SOURCE: NORMAL

## 2023-02-18 LAB
BACTERIA STL CULT: NORMAL
C JEJUNI+C COLI AG STL QL: NORMAL
E COLI SXT1+2 STL IA: NORMAL
SIGNIFICANT IND 70042: NORMAL
SITE SITE: NORMAL
SOURCE SOURCE: NORMAL

## 2023-02-19 LAB — CALPROTECTIN STL-MCNT: 46 UG/G

## 2023-03-31 ENCOUNTER — HOSPITAL ENCOUNTER (OUTPATIENT)
Dept: LAB | Facility: MEDICAL CENTER | Age: 58
End: 2023-03-31
Attending: INTERNAL MEDICINE
Payer: COMMERCIAL

## 2023-03-31 LAB
ALBUMIN SERPL BCP-MCNC: 4.5 G/DL (ref 3.2–4.9)
ALBUMIN/GLOB SERPL: 1.5 G/DL
ALP SERPL-CCNC: 109 U/L (ref 30–99)
ALT SERPL-CCNC: 38 U/L (ref 2–50)
ANION GAP SERPL CALC-SCNC: 13 MMOL/L (ref 7–16)
APPEARANCE UR: CLEAR
AST SERPL-CCNC: 27 U/L (ref 12–45)
BASOPHILS # BLD AUTO: 0.9 % (ref 0–1.8)
BASOPHILS # BLD: 0.06 K/UL (ref 0–0.12)
BILIRUB SERPL-MCNC: 0.3 MG/DL (ref 0.1–1.5)
BILIRUB UR QL STRIP.AUTO: NEGATIVE
BUN SERPL-MCNC: 23 MG/DL (ref 8–22)
C3 SERPL-MCNC: 139 MG/DL (ref 87–200)
C4 SERPL-MCNC: 24.4 MG/DL (ref 19–52)
CALCIUM ALBUM COR SERPL-MCNC: 9 MG/DL (ref 8.5–10.5)
CALCIUM SERPL-MCNC: 9.4 MG/DL (ref 8.5–10.5)
CHLORIDE SERPL-SCNC: 106 MMOL/L (ref 96–112)
CO2 SERPL-SCNC: 22 MMOL/L (ref 20–33)
COLOR UR: YELLOW
CREAT SERPL-MCNC: 0.89 MG/DL (ref 0.5–1.4)
CREAT UR-MCNC: 19.4 MG/DL
CRP SERPL HS-MCNC: 0.38 MG/DL (ref 0–0.75)
EOSINOPHIL # BLD AUTO: 0.21 K/UL (ref 0–0.51)
EOSINOPHIL NFR BLD: 3.1 % (ref 0–6.9)
ERYTHROCYTE [DISTWIDTH] IN BLOOD BY AUTOMATED COUNT: 40.2 FL (ref 35.9–50)
ERYTHROCYTE [SEDIMENTATION RATE] IN BLOOD BY WESTERGREN METHOD: 4 MM/HOUR (ref 0–25)
GFR SERPLBLD CREATININE-BSD FMLA CKD-EPI: 76 ML/MIN/1.73 M 2
GLOBULIN SER CALC-MCNC: 3.1 G/DL (ref 1.9–3.5)
GLUCOSE SERPL-MCNC: 116 MG/DL (ref 65–99)
GLUCOSE UR STRIP.AUTO-MCNC: NEGATIVE MG/DL
HCT VFR BLD AUTO: 50.1 % (ref 37–47)
HGB BLD-MCNC: 17 G/DL (ref 12–16)
IMM GRANULOCYTES # BLD AUTO: 0.04 K/UL (ref 0–0.11)
IMM GRANULOCYTES NFR BLD AUTO: 0.6 % (ref 0–0.9)
KETONES UR STRIP.AUTO-MCNC: NEGATIVE MG/DL
LEUKOCYTE ESTERASE UR QL STRIP.AUTO: NEGATIVE
LYMPHOCYTES # BLD AUTO: 1.79 K/UL (ref 1–4.8)
LYMPHOCYTES NFR BLD: 26.6 % (ref 22–41)
MCH RBC QN AUTO: 29.8 PG (ref 27–33)
MCHC RBC AUTO-ENTMCNC: 33.9 G/DL (ref 33.6–35)
MCV RBC AUTO: 87.9 FL (ref 81.4–97.8)
MICRO URNS: NORMAL
MONOCYTES # BLD AUTO: 0.43 K/UL (ref 0–0.85)
MONOCYTES NFR BLD AUTO: 6.4 % (ref 0–13.4)
NEUTROPHILS # BLD AUTO: 4.21 K/UL (ref 2–7.15)
NEUTROPHILS NFR BLD: 62.4 % (ref 44–72)
NITRITE UR QL STRIP.AUTO: NEGATIVE
NRBC # BLD AUTO: 0 K/UL
NRBC BLD-RTO: 0 /100 WBC
PH UR STRIP.AUTO: 6 [PH] (ref 5–8)
PLATELET # BLD AUTO: 350 K/UL (ref 164–446)
PMV BLD AUTO: 9.3 FL (ref 9–12.9)
POTASSIUM SERPL-SCNC: 4.1 MMOL/L (ref 3.6–5.5)
PROT SERPL-MCNC: 7.6 G/DL (ref 6–8.2)
PROT UR QL STRIP: NEGATIVE MG/DL
PROT UR-MCNC: <4 MG/DL (ref 0–15)
PROT/CREAT UR: NORMAL MG/G (ref 10–107)
RBC # BLD AUTO: 5.7 M/UL (ref 4.2–5.4)
RBC UR QL AUTO: NEGATIVE
SODIUM SERPL-SCNC: 141 MMOL/L (ref 135–145)
SP GR UR STRIP.AUTO: 1.01
UROBILINOGEN UR STRIP.AUTO-MCNC: 0.2 MG/DL
WBC # BLD AUTO: 6.7 K/UL (ref 4.8–10.8)

## 2023-03-31 PROCEDURE — 84156 ASSAY OF PROTEIN URINE: CPT

## 2023-03-31 PROCEDURE — 80053 COMPREHEN METABOLIC PANEL: CPT

## 2023-03-31 PROCEDURE — 81003 URINALYSIS AUTO W/O SCOPE: CPT

## 2023-03-31 PROCEDURE — 86140 C-REACTIVE PROTEIN: CPT

## 2023-03-31 PROCEDURE — 85025 COMPLETE CBC W/AUTO DIFF WBC: CPT

## 2023-03-31 PROCEDURE — 86160 COMPLEMENT ANTIGEN: CPT

## 2023-03-31 PROCEDURE — 85652 RBC SED RATE AUTOMATED: CPT

## 2023-03-31 PROCEDURE — 36415 COLL VENOUS BLD VENIPUNCTURE: CPT

## 2023-03-31 PROCEDURE — 82570 ASSAY OF URINE CREATININE: CPT

## 2023-04-27 ENCOUNTER — HOSPITAL ENCOUNTER (OUTPATIENT)
Dept: LAB | Facility: MEDICAL CENTER | Age: 58
End: 2023-04-27
Attending: NURSE PRACTITIONER
Payer: COMMERCIAL

## 2023-04-27 LAB
T4 FREE SERPL-MCNC: 1.43 NG/DL (ref 0.93–1.7)
TSH SERPL DL<=0.005 MIU/L-ACNC: 1.82 UIU/ML (ref 0.38–5.33)

## 2023-04-27 PROCEDURE — 84439 ASSAY OF FREE THYROXINE: CPT

## 2023-04-27 PROCEDURE — 84443 ASSAY THYROID STIM HORMONE: CPT

## 2023-04-27 PROCEDURE — 36415 COLL VENOUS BLD VENIPUNCTURE: CPT

## 2023-07-31 ENCOUNTER — HOSPITAL ENCOUNTER (OUTPATIENT)
Dept: LAB | Facility: MEDICAL CENTER | Age: 58
End: 2023-07-31
Attending: NURSE PRACTITIONER
Payer: COMMERCIAL

## 2023-07-31 ENCOUNTER — HOSPITAL ENCOUNTER (OUTPATIENT)
Dept: LAB | Facility: MEDICAL CENTER | Age: 58
End: 2023-07-31
Attending: INTERNAL MEDICINE
Payer: COMMERCIAL

## 2023-07-31 LAB
ALBUMIN SERPL BCP-MCNC: 4.4 G/DL (ref 3.2–4.9)
ALBUMIN SERPL BCP-MCNC: 4.6 G/DL (ref 3.2–4.9)
ALBUMIN/GLOB SERPL: 1.4 G/DL
ALBUMIN/GLOB SERPL: 1.8 G/DL
ALP SERPL-CCNC: 103 U/L (ref 30–99)
ALP SERPL-CCNC: 109 U/L (ref 30–99)
ALT SERPL-CCNC: 16 U/L (ref 2–50)
ALT SERPL-CCNC: 23 U/L (ref 2–50)
ANION GAP SERPL CALC-SCNC: 13 MMOL/L (ref 7–16)
ANION GAP SERPL CALC-SCNC: 16 MMOL/L (ref 7–16)
APPEARANCE UR: CLEAR
AST SERPL-CCNC: 20 U/L (ref 12–45)
AST SERPL-CCNC: 21 U/L (ref 12–45)
BASOPHILS # BLD AUTO: 0.9 % (ref 0–1.8)
BASOPHILS # BLD AUTO: 0.9 % (ref 0–1.8)
BASOPHILS # BLD: 0.06 K/UL (ref 0–0.12)
BASOPHILS # BLD: 0.06 K/UL (ref 0–0.12)
BILIRUB SERPL-MCNC: 0.5 MG/DL (ref 0.1–1.5)
BILIRUB SERPL-MCNC: 0.6 MG/DL (ref 0.1–1.5)
BILIRUB UR QL STRIP.AUTO: NEGATIVE
BUN SERPL-MCNC: 18 MG/DL (ref 8–22)
BUN SERPL-MCNC: 19 MG/DL (ref 8–22)
C3 SERPL-MCNC: 139 MG/DL (ref 87–200)
C4 SERPL-MCNC: 23.3 MG/DL (ref 19–52)
CALCIUM ALBUM COR SERPL-MCNC: 8.8 MG/DL (ref 8.5–10.5)
CALCIUM ALBUM COR SERPL-MCNC: 9.2 MG/DL (ref 8.5–10.5)
CALCIUM SERPL-MCNC: 9.1 MG/DL (ref 8.5–10.5)
CALCIUM SERPL-MCNC: 9.7 MG/DL (ref 8.5–10.5)
CHLORIDE SERPL-SCNC: 103 MMOL/L (ref 96–112)
CHLORIDE SERPL-SCNC: 103 MMOL/L (ref 96–112)
CHOLEST SERPL-MCNC: 189 MG/DL (ref 100–199)
CO2 SERPL-SCNC: 22 MMOL/L (ref 20–33)
CO2 SERPL-SCNC: 23 MMOL/L (ref 20–33)
COLOR UR: YELLOW
CREAT SERPL-MCNC: 0.91 MG/DL (ref 0.5–1.4)
CREAT SERPL-MCNC: 0.97 MG/DL (ref 0.5–1.4)
CREAT UR-MCNC: 82.82 MG/DL
CRP SERPL HS-MCNC: 0.65 MG/DL (ref 0–0.75)
EOSINOPHIL # BLD AUTO: 0.07 K/UL (ref 0–0.51)
EOSINOPHIL # BLD AUTO: 0.1 K/UL (ref 0–0.51)
EOSINOPHIL NFR BLD: 1.1 % (ref 0–6.9)
EOSINOPHIL NFR BLD: 1.6 % (ref 0–6.9)
ERYTHROCYTE [DISTWIDTH] IN BLOOD BY AUTOMATED COUNT: 42.9 FL (ref 35.9–50)
ERYTHROCYTE [DISTWIDTH] IN BLOOD BY AUTOMATED COUNT: 43 FL (ref 35.9–50)
ERYTHROCYTE [SEDIMENTATION RATE] IN BLOOD BY WESTERGREN METHOD: 3 MM/HOUR (ref 0–25)
EST. AVERAGE GLUCOSE BLD GHB EST-MCNC: 97 MG/DL
GFR SERPLBLD CREATININE-BSD FMLA CKD-EPI: 69 ML/MIN/1.73 M 2
GFR SERPLBLD CREATININE-BSD FMLA CKD-EPI: 74 ML/MIN/1.73 M 2
GLOBULIN SER CALC-MCNC: 2.5 G/DL (ref 1.9–3.5)
GLOBULIN SER CALC-MCNC: 3.1 G/DL (ref 1.9–3.5)
GLUCOSE SERPL-MCNC: 74 MG/DL (ref 65–99)
GLUCOSE SERPL-MCNC: 78 MG/DL (ref 65–99)
GLUCOSE UR STRIP.AUTO-MCNC: NEGATIVE MG/DL
HBA1C MFR BLD: 5 % (ref 4–5.6)
HCT VFR BLD AUTO: 49.2 % (ref 37–47)
HCT VFR BLD AUTO: 49.7 % (ref 37–47)
HDLC SERPL-MCNC: 55 MG/DL
HGB BLD-MCNC: 16.6 G/DL (ref 12–16)
HGB BLD-MCNC: 16.7 G/DL (ref 12–16)
IMM GRANULOCYTES # BLD AUTO: 0.03 K/UL (ref 0–0.11)
IMM GRANULOCYTES # BLD AUTO: 0.03 K/UL (ref 0–0.11)
IMM GRANULOCYTES NFR BLD AUTO: 0.5 % (ref 0–0.9)
IMM GRANULOCYTES NFR BLD AUTO: 0.5 % (ref 0–0.9)
KETONES UR STRIP.AUTO-MCNC: NEGATIVE MG/DL
LDLC SERPL CALC-MCNC: 119 MG/DL
LEUKOCYTE ESTERASE UR QL STRIP.AUTO: NEGATIVE
LYMPHOCYTES # BLD AUTO: 1.99 K/UL (ref 1–4.8)
LYMPHOCYTES # BLD AUTO: 2.02 K/UL (ref 1–4.8)
LYMPHOCYTES NFR BLD: 31.2 % (ref 22–41)
LYMPHOCYTES NFR BLD: 31.7 % (ref 22–41)
MCH RBC QN AUTO: 29.7 PG (ref 27–33)
MCH RBC QN AUTO: 29.9 PG (ref 27–33)
MCHC RBC AUTO-ENTMCNC: 33.6 G/DL (ref 32.2–35.5)
MCHC RBC AUTO-ENTMCNC: 33.7 G/DL (ref 32.2–35.5)
MCV RBC AUTO: 88.3 FL (ref 81.4–97.8)
MCV RBC AUTO: 88.6 FL (ref 81.4–97.8)
MICRO URNS: NORMAL
MONOCYTES # BLD AUTO: 0.4 K/UL (ref 0–0.85)
MONOCYTES # BLD AUTO: 0.41 K/UL (ref 0–0.85)
MONOCYTES NFR BLD AUTO: 6.3 % (ref 0–13.4)
MONOCYTES NFR BLD AUTO: 6.4 % (ref 0–13.4)
NEUTROPHILS # BLD AUTO: 3.75 K/UL (ref 1.82–7.42)
NEUTROPHILS # BLD AUTO: 3.82 K/UL (ref 1.82–7.42)
NEUTROPHILS NFR BLD: 58.9 % (ref 44–72)
NEUTROPHILS NFR BLD: 60 % (ref 44–72)
NITRITE UR QL STRIP.AUTO: NEGATIVE
NRBC # BLD AUTO: 0 K/UL
NRBC # BLD AUTO: 0 K/UL
NRBC BLD-RTO: 0 /100 WBC (ref 0–0.2)
NRBC BLD-RTO: 0 /100 WBC (ref 0–0.2)
PH UR STRIP.AUTO: 6.5 [PH] (ref 5–8)
PLATELET # BLD AUTO: 344 K/UL (ref 164–446)
PLATELET # BLD AUTO: 346 K/UL (ref 164–446)
PMV BLD AUTO: 9.3 FL (ref 9–12.9)
PMV BLD AUTO: 9.5 FL (ref 9–12.9)
POTASSIUM SERPL-SCNC: 3.7 MMOL/L (ref 3.6–5.5)
POTASSIUM SERPL-SCNC: 4.1 MMOL/L (ref 3.6–5.5)
PROT SERPL-MCNC: 7.1 G/DL (ref 6–8.2)
PROT SERPL-MCNC: 7.5 G/DL (ref 6–8.2)
PROT UR QL STRIP: NEGATIVE MG/DL
PROT UR-MCNC: 5 MG/DL (ref 0–15)
PROT/CREAT UR: 60 MG/G (ref 10–107)
RBC # BLD AUTO: 5.55 M/UL (ref 4.2–5.4)
RBC # BLD AUTO: 5.63 M/UL (ref 4.2–5.4)
RBC UR QL AUTO: NEGATIVE
SODIUM SERPL-SCNC: 139 MMOL/L (ref 135–145)
SODIUM SERPL-SCNC: 141 MMOL/L (ref 135–145)
SP GR UR STRIP.AUTO: 1.01
T4 FREE SERPL-MCNC: 1.5 NG/DL (ref 0.93–1.7)
TRIGL SERPL-MCNC: 75 MG/DL (ref 0–149)
TSH SERPL DL<=0.005 MIU/L-ACNC: 2.63 UIU/ML (ref 0.38–5.33)
UROBILINOGEN UR STRIP.AUTO-MCNC: 0.2 MG/DL
WBC # BLD AUTO: 6.4 K/UL (ref 4.8–10.8)
WBC # BLD AUTO: 6.4 K/UL (ref 4.8–10.8)

## 2023-07-31 PROCEDURE — 83036 HEMOGLOBIN GLYCOSYLATED A1C: CPT

## 2023-07-31 PROCEDURE — 86140 C-REACTIVE PROTEIN: CPT

## 2023-07-31 PROCEDURE — 86160 COMPLEMENT ANTIGEN: CPT

## 2023-07-31 PROCEDURE — 85652 RBC SED RATE AUTOMATED: CPT

## 2023-07-31 PROCEDURE — 80053 COMPREHEN METABOLIC PANEL: CPT | Mod: 91

## 2023-07-31 PROCEDURE — 84156 ASSAY OF PROTEIN URINE: CPT

## 2023-07-31 PROCEDURE — 80061 LIPID PANEL: CPT

## 2023-07-31 PROCEDURE — 80053 COMPREHEN METABOLIC PANEL: CPT

## 2023-07-31 PROCEDURE — 82570 ASSAY OF URINE CREATININE: CPT

## 2023-07-31 PROCEDURE — 84439 ASSAY OF FREE THYROXINE: CPT

## 2023-07-31 PROCEDURE — 36415 COLL VENOUS BLD VENIPUNCTURE: CPT

## 2023-07-31 PROCEDURE — 81003 URINALYSIS AUTO W/O SCOPE: CPT

## 2023-07-31 PROCEDURE — 85025 COMPLETE CBC W/AUTO DIFF WBC: CPT

## 2023-07-31 PROCEDURE — 84443 ASSAY THYROID STIM HORMONE: CPT

## 2023-07-31 PROCEDURE — 85025 COMPLETE CBC W/AUTO DIFF WBC: CPT | Mod: 91

## 2023-09-14 ENCOUNTER — APPOINTMENT (OUTPATIENT)
Dept: DERMATOLOGY | Facility: IMAGING CENTER | Age: 58
End: 2023-09-14
Payer: COMMERCIAL

## 2023-09-14 ENCOUNTER — OFFICE VISIT (OUTPATIENT)
Dept: DERMATOLOGY | Facility: IMAGING CENTER | Age: 58
End: 2023-09-14
Payer: COMMERCIAL

## 2023-09-14 DIAGNOSIS — B00.9 HERPES: ICD-10-CM

## 2023-09-14 DIAGNOSIS — Z12.83 SKIN CANCER SCREENING: ICD-10-CM

## 2023-09-14 DIAGNOSIS — L90.8 SKIN AGING: ICD-10-CM

## 2023-09-14 DIAGNOSIS — M35.9 CONNECTIVE TISSUE DISEASE (HCC): ICD-10-CM

## 2023-09-14 DIAGNOSIS — L82.1 SEBORRHEIC KERATOSIS: ICD-10-CM

## 2023-09-14 DIAGNOSIS — B07.9 VIRAL WARTS, UNSPECIFIED TYPE: ICD-10-CM

## 2023-09-14 DIAGNOSIS — D22.9 NEVUS: ICD-10-CM

## 2023-09-14 DIAGNOSIS — L81.4 LENTIGO: ICD-10-CM

## 2023-09-14 DIAGNOSIS — L21.9 SEBORRHEA: ICD-10-CM

## 2023-09-14 PROBLEM — K62.5 BRBPR (BRIGHT RED BLOOD PER RECTUM): Status: ACTIVE | Noted: 2023-09-14

## 2023-09-14 PROBLEM — K55.20 ANGIODYSPLASIA OF COLON WITHOUT HEMORRHAGE: Status: ACTIVE | Noted: 2017-10-19

## 2023-09-14 PROBLEM — K92.1 HEMATOCHEZIA: Status: ACTIVE | Noted: 2023-09-14

## 2023-09-14 PROBLEM — R19.4 ENCOUNTER FOR DIAGNOSTIC COLONOSCOPY DUE TO CHANGE IN BOWEL HABITS: Status: ACTIVE | Noted: 2023-09-14

## 2023-09-14 PROBLEM — I10 HYPERTENSION: Status: ACTIVE | Noted: 2023-09-14

## 2023-09-14 PROBLEM — E03.9 HYPOTHYROIDISM: Status: ACTIVE | Noted: 2023-09-14

## 2023-09-14 PROBLEM — R15.9 COMPLETE FECAL INCONTINENCE: Status: ACTIVE | Noted: 2023-09-14

## 2023-09-14 PROBLEM — L94.0 REYNOLDS SYNDROME (HCC): Status: ACTIVE | Noted: 2023-09-14

## 2023-09-14 PROBLEM — R19.5 MUCUS IN STOOL: Status: ACTIVE | Noted: 2023-09-14

## 2023-09-14 PROBLEM — R19.8 CHANGE IN BOWEL FUNCTION: Status: ACTIVE | Noted: 2023-01-01

## 2023-09-14 PROBLEM — G43.909 MIGRAINES: Status: ACTIVE | Noted: 2023-09-14

## 2023-09-14 PROBLEM — K74.3 REYNOLDS SYNDROME (HCC): Status: ACTIVE | Noted: 2023-09-14

## 2023-09-14 PROCEDURE — 99204 OFFICE O/P NEW MOD 45 MIN: CPT | Mod: 25 | Performed by: DERMATOLOGY

## 2023-09-14 PROCEDURE — 17110 DESTRUCTION B9 LES UP TO 14: CPT | Performed by: DERMATOLOGY

## 2023-09-14 RX ORDER — LEVOTHYROXINE SODIUM 0.07 MG/1
1 TABLET ORAL
COMMUNITY

## 2023-09-14 RX ORDER — FLUOCINONIDE TOPICAL SOLUTION USP, 0.05% 0.5 MG/ML
SOLUTION TOPICAL
Qty: 60 ML | Refills: 2 | Status: SHIPPED | OUTPATIENT
Start: 2023-09-14

## 2023-09-14 RX ORDER — VALACYCLOVIR HYDROCHLORIDE 1 G/1
TABLET, FILM COATED ORAL
COMMUNITY

## 2023-09-14 NOTE — ED TRIAGE NOTES
Pt comes in w/   C/o migraine   Started out yesterday w/ H/A while at work  Took tylenol w/ little to no effect t/o the day   H/A has now turned into a migraine around 6 pm last night   Took her Fioricet (no effect)  Zanaflex (no effect) and has used Flector patch  Nothing is helping  Now migraine has turned into a occular migraine  C/o nausea      Topical Steroids Applications Pregnancy And Lactation Text: Most topical steroids are considered safe to use during pregnancy and lactation.  Any topical steroid applied to the breast or nipple should be washed off before breastfeeding.

## 2023-09-14 NOTE — PROGRESS NOTES
CC: RUSS    Subjective: New Patient here for RUSS  Few warts come/go, hands lia    HPI/location: Nose  Time present: July 2023  Painful lesion: No  Itching lesion: No  Enlarging lesion: Yes    Hx Lupus  History of skin cancer: No  History of precancers/actinic keratoses: No  History of biopsies:Yes, Details: 25 years ago Benign  History of blistering/severe sunburns:Yes, Details: Childhood  Family history of skin cancer:Yes, Details: Maternal Aunt unknown type  Family history of atypical moles:No    History of cold sores    ROS: no fevers/chills. No itch.  No cough  Relevant PMH:hx lupus - azathioprine  Social:NS    PE: Gen:WDWN female in NAD. Skin: Scalp/face/eyes/lips/neck/chest/back/arms/legs/hands/feet/buttocks - without suspicious lesions noted.  Genitals exam declined  -crusted papule right upper lip  -verrucous papules on right nasal bridge, left intra 4/5 digit, right medial hand, right shoulder  -scattered hyperpigmented macules/papules, appearing benign on torso and extremities      A/P: oral HSV: reviewed prevention strategies     seborrhea,scalp:  -counseled re: dx/tx  -antidandruff shampoos - pick 2 and alternate Q3-4 days   -lidex solution Qday-BID--> PRN    Nevi: benign appearing:  -Reviewed ABCDEs, handout supplied  -Reviewed skin cancer detection/prevention  -RTC PRN growth/changes/concerning features    Lentigos/SKs: benign  -reassurance  -reviewed skin cancer detection/prevention     VV, few:  -LN2 25 sec X 2 cycles  X4 lesions  -f/u if persists at 1 month  -home therapies reviewed    Annual skin check    I have reviewed medications relevant to my specialty.

## 2023-09-18 ENCOUNTER — HOSPITAL ENCOUNTER (OUTPATIENT)
Dept: RADIOLOGY | Facility: MEDICAL CENTER | Age: 58
End: 2023-09-18
Attending: OBSTETRICS & GYNECOLOGY
Payer: COMMERCIAL

## 2023-09-18 DIAGNOSIS — Z13.820 OSTEOPOROSIS SCREENING: ICD-10-CM

## 2023-09-18 DIAGNOSIS — Z12.31 ENCOUNTER FOR MAMMOGRAM TO ESTABLISH BASELINE MAMMOGRAM: ICD-10-CM

## 2023-09-18 DIAGNOSIS — R92.30 BREAST DENSITY: ICD-10-CM

## 2023-09-18 PROCEDURE — 77080 DXA BONE DENSITY AXIAL: CPT

## 2023-09-18 PROCEDURE — 77063 BREAST TOMOSYNTHESIS BI: CPT

## 2023-10-05 ENCOUNTER — HOSPITAL ENCOUNTER (OUTPATIENT)
Facility: MEDICAL CENTER | Age: 58
End: 2023-10-05
Attending: INTERNAL MEDICINE
Payer: COMMERCIAL

## 2023-10-05 PROCEDURE — 81270 JAK2 GENE: CPT

## 2023-10-05 PROCEDURE — 81219 CALR GENE COM VARIANTS: CPT

## 2023-10-05 PROCEDURE — 82668 ASSAY OF ERYTHROPOIETIN: CPT

## 2023-10-05 PROCEDURE — 81338 MPL GENE COMMON VARIANTS: CPT

## 2023-10-08 LAB — EPO SERPL-ACNC: 8 MU/ML (ref 4–27)

## 2023-10-16 LAB
JAK2 P.V617F BLD/T QL: NOT DETECTED
SPECIMEN SOURCE: NORMAL

## 2023-10-20 LAB
GENE XXX MUT ANL BLD/T: NOT DETECTED
MPL P.W515 BLD/T QL: NOT DETECTED

## 2023-11-16 ENCOUNTER — HOSPITAL ENCOUNTER (OUTPATIENT)
Facility: MEDICAL CENTER | Age: 58
End: 2023-11-16
Attending: INTERNAL MEDICINE
Payer: COMMERCIAL

## 2023-11-16 PROCEDURE — 369999 HCHG MISC LAB CHARGE

## 2023-11-16 PROCEDURE — 0027U JAK2 GENE TRGT SEQ ALYS: CPT

## 2023-11-17 ENCOUNTER — HOSPITAL ENCOUNTER (OUTPATIENT)
Dept: RADIOLOGY | Facility: MEDICAL CENTER | Age: 58
End: 2023-11-17
Attending: OBSTETRICS & GYNECOLOGY
Payer: COMMERCIAL

## 2023-11-17 DIAGNOSIS — R92.30 BREAST DENSITY: ICD-10-CM

## 2023-11-17 DIAGNOSIS — Z12.31 ENCOUNTER FOR MAMMOGRAM TO ESTABLISH BASELINE MAMMOGRAM: ICD-10-CM

## 2023-11-17 PROCEDURE — 76641 ULTRASOUND BREAST COMPLETE: CPT

## 2023-11-25 LAB — TEST NAME 95000: NORMAL

## 2023-12-14 ENCOUNTER — HOSPITAL ENCOUNTER (OUTPATIENT)
Dept: LAB | Facility: MEDICAL CENTER | Age: 58
End: 2023-12-14
Attending: INTERNAL MEDICINE
Payer: COMMERCIAL

## 2023-12-14 LAB
ALBUMIN SERPL BCP-MCNC: 4.6 G/DL (ref 3.2–4.9)
ALP SERPL-CCNC: 99 U/L (ref 30–99)
ALT SERPL-CCNC: 21 U/L (ref 2–50)
APPEARANCE UR: CLEAR
AST SERPL-CCNC: 19 U/L (ref 12–45)
BACTERIA #/AREA URNS HPF: NEGATIVE /HPF
BASOPHILS # BLD AUTO: 0.7 % (ref 0–1.8)
BASOPHILS # BLD: 0.06 K/UL (ref 0–0.12)
BILIRUB CONJ SERPL-MCNC: <0.2 MG/DL (ref 0.1–0.5)
BILIRUB INDIRECT SERPL-MCNC: NORMAL MG/DL (ref 0–1)
BILIRUB SERPL-MCNC: 0.3 MG/DL (ref 0.1–1.5)
BILIRUB UR QL STRIP.AUTO: NEGATIVE
C3 SERPL-MCNC: 154.8 MG/DL (ref 87–200)
C4 SERPL-MCNC: 26.9 MG/DL (ref 19–52)
COLOR UR: YELLOW
CREAT SERPL-MCNC: 0.69 MG/DL (ref 0.5–1.4)
CREAT UR-MCNC: 100.71 MG/DL
CRP SERPL HS-MCNC: <0.3 MG/DL (ref 0–0.75)
EOSINOPHIL # BLD AUTO: 0.1 K/UL (ref 0–0.51)
EOSINOPHIL NFR BLD: 1.2 % (ref 0–6.9)
EPI CELLS #/AREA URNS HPF: NEGATIVE /HPF
ERYTHROCYTE [DISTWIDTH] IN BLOOD BY AUTOMATED COUNT: 42.3 FL (ref 35.9–50)
ERYTHROCYTE [SEDIMENTATION RATE] IN BLOOD BY WESTERGREN METHOD: 7 MM/HOUR (ref 0–25)
GFR SERPLBLD CREATININE-BSD FMLA CKD-EPI: 102 ML/MIN/1.73 M 2
GLUCOSE UR STRIP.AUTO-MCNC: NEGATIVE MG/DL
HCT VFR BLD AUTO: 48.7 % (ref 37–47)
HGB BLD-MCNC: 16.7 G/DL (ref 12–16)
HYALINE CASTS #/AREA URNS LPF: NORMAL /LPF
IMM GRANULOCYTES # BLD AUTO: 0.05 K/UL (ref 0–0.11)
IMM GRANULOCYTES NFR BLD AUTO: 0.6 % (ref 0–0.9)
KETONES UR STRIP.AUTO-MCNC: ABNORMAL MG/DL
LEUKOCYTE ESTERASE UR QL STRIP.AUTO: ABNORMAL
LYMPHOCYTES # BLD AUTO: 1.87 K/UL (ref 1–4.8)
LYMPHOCYTES NFR BLD: 22.6 % (ref 22–41)
MCH RBC QN AUTO: 30.3 PG (ref 27–33)
MCHC RBC AUTO-ENTMCNC: 34.3 G/DL (ref 32.2–35.5)
MCV RBC AUTO: 88.4 FL (ref 81.4–97.8)
MICRO URNS: ABNORMAL
MONOCYTES # BLD AUTO: 0.46 K/UL (ref 0–0.85)
MONOCYTES NFR BLD AUTO: 5.6 % (ref 0–13.4)
NEUTROPHILS # BLD AUTO: 5.72 K/UL (ref 1.82–7.42)
NEUTROPHILS NFR BLD: 69.3 % (ref 44–72)
NITRITE UR QL STRIP.AUTO: NEGATIVE
NRBC # BLD AUTO: 0 K/UL
NRBC BLD-RTO: 0 /100 WBC (ref 0–0.2)
PH UR STRIP.AUTO: 5.5 [PH] (ref 5–8)
PLATELET # BLD AUTO: 354 K/UL (ref 164–446)
PMV BLD AUTO: 9.1 FL (ref 9–12.9)
PROT SERPL-MCNC: 7.5 G/DL (ref 6–8.2)
PROT UR QL STRIP: NEGATIVE MG/DL
PROT UR-MCNC: 7 MG/DL (ref 0–15)
PROT/CREAT UR: 70 MG/G (ref 10–107)
RBC # BLD AUTO: 5.51 M/UL (ref 4.2–5.4)
RBC # URNS HPF: NORMAL /HPF
RBC UR QL AUTO: NEGATIVE
SP GR UR STRIP.AUTO: 1.02
UROBILINOGEN UR STRIP.AUTO-MCNC: 0.2 MG/DL
WBC # BLD AUTO: 8.3 K/UL (ref 4.8–10.8)
WBC #/AREA URNS HPF: NORMAL /HPF

## 2023-12-14 PROCEDURE — 86160 COMPLEMENT ANTIGEN: CPT | Mod: 91

## 2023-12-14 PROCEDURE — 80076 HEPATIC FUNCTION PANEL: CPT

## 2023-12-14 PROCEDURE — 36415 COLL VENOUS BLD VENIPUNCTURE: CPT

## 2023-12-14 PROCEDURE — 84156 ASSAY OF PROTEIN URINE: CPT

## 2023-12-14 PROCEDURE — 86140 C-REACTIVE PROTEIN: CPT

## 2023-12-14 PROCEDURE — 85652 RBC SED RATE AUTOMATED: CPT

## 2023-12-14 PROCEDURE — 85025 COMPLETE CBC W/AUTO DIFF WBC: CPT

## 2023-12-14 PROCEDURE — 82570 ASSAY OF URINE CREATININE: CPT

## 2023-12-14 PROCEDURE — 82565 ASSAY OF CREATININE: CPT

## 2023-12-14 PROCEDURE — 81001 URINALYSIS AUTO W/SCOPE: CPT

## 2024-01-30 ENCOUNTER — HOSPITAL ENCOUNTER (OUTPATIENT)
Dept: LAB | Facility: MEDICAL CENTER | Age: 59
End: 2024-01-30
Attending: NURSE PRACTITIONER
Payer: COMMERCIAL

## 2024-01-30 LAB
CHOLEST SERPL-MCNC: 224 MG/DL (ref 100–199)
HDLC SERPL-MCNC: 62 MG/DL
LDLC SERPL CALC-MCNC: 140 MG/DL
TRIGL SERPL-MCNC: 109 MG/DL (ref 0–149)

## 2024-01-30 PROCEDURE — 84443 ASSAY THYROID STIM HORMONE: CPT

## 2024-01-30 PROCEDURE — 84439 ASSAY OF FREE THYROXINE: CPT

## 2024-01-30 PROCEDURE — 36415 COLL VENOUS BLD VENIPUNCTURE: CPT

## 2024-01-30 PROCEDURE — 80061 LIPID PANEL: CPT

## 2024-01-31 LAB
T4 FREE SERPL-MCNC: 1.67 NG/DL (ref 0.93–1.7)
TSH SERPL DL<=0.005 MIU/L-ACNC: 1.15 UIU/ML (ref 0.38–5.33)

## 2024-03-07 ENCOUNTER — OFFICE VISIT (OUTPATIENT)
Dept: SLEEP MEDICINE | Facility: MEDICAL CENTER | Age: 59
End: 2024-03-07
Attending: PREVENTIVE MEDICINE
Payer: COMMERCIAL

## 2024-03-07 VITALS
RESPIRATION RATE: 16 BRPM | WEIGHT: 207 LBS | OXYGEN SATURATION: 96 % | HEIGHT: 64 IN | BODY MASS INDEX: 35.34 KG/M2 | SYSTOLIC BLOOD PRESSURE: 110 MMHG | HEART RATE: 77 BPM | DIASTOLIC BLOOD PRESSURE: 70 MMHG

## 2024-03-07 DIAGNOSIS — G47.33 OSA ON CPAP: ICD-10-CM

## 2024-03-07 PROCEDURE — 99204 OFFICE O/P NEW MOD 45 MIN: CPT | Performed by: PREVENTIVE MEDICINE

## 2024-03-07 PROCEDURE — 99213 OFFICE O/P EST LOW 20 MIN: CPT | Performed by: PREVENTIVE MEDICINE

## 2024-03-07 PROCEDURE — 3078F DIAST BP <80 MM HG: CPT | Performed by: PREVENTIVE MEDICINE

## 2024-03-07 PROCEDURE — 3074F SYST BP LT 130 MM HG: CPT | Performed by: PREVENTIVE MEDICINE

## 2024-03-07 RX ORDER — LOSARTAN POTASSIUM AND HYDROCHLOROTHIAZIDE 25; 100 MG/1; MG/1
1 TABLET ORAL
COMMUNITY
Start: 2024-01-29

## 2024-03-07 RX ORDER — CALCIUM CARBONATE/VITAMIN D3 500 MG-10
TABLET ORAL
COMMUNITY

## 2024-03-07 RX ORDER — ATOGEPANT 30 MG/1
1 TABLET ORAL
COMMUNITY
Start: 2024-02-09

## 2024-03-07 ASSESSMENT — FIBROSIS 4 INDEX: FIB4 SCORE: 0.66

## 2024-03-07 NOTE — PROGRESS NOTES
"CHIEF COMPLIANT: \"Establish care.\"  HISTORY OF PRESENT ILLNESS:  Eveline Galo is a 58 y.o. female kindly referred by VIVIANA Estrada and  is here for a sleep medicine consultation.  This patient has already been tested using a HST and has already been started on PAP therapy.   This pt has Lupus and migraines.  Has been treated with botox and  qulilpta  for migraines    Sleep History:  This patient has met all insurance requirements for compliance regarding PAP therapy.   Eveline Galo has  been tested for sleep apnea.    ( See below). The patient reports polycythemia, snoring and restless sleep.  The patient goes to bed at 10 pm and wakes up at 6-7 am. It usually takes the patient approximately 10-15 mins to fall asleep. The patient does not feel refreshed after sleeping and does not nap during the day If she has a lupus flare or severe migraines. The patient has never used tobacco.  Pt does not use cannabis.  This pt does drink 3-5 alcoholic beverages per month and has 1 caffeinated beverages daily.     The patient denies any symptoms of narcolepsy such as sleep paralysis or cataplexy, or any symptoms that suggest parasomnias such as sleep walking or acting out of dreams.    Eveline Galo is a  [employer] with [employer]     Endorses family members who use PAP therapy/snore:unknown  except for brother  and both sons have been dxd with sleep apnea    EPWORTH SCORE:   8   /24, this is  NOT  consistent with EDS      COMPLIANCE DATA:  > 4 hours at  93 %  Machine type: AirSense 10 Autoset  Date range:  2/6-3/6/2024  AHI:  1.2  TIME USED:  8 hours  PRESSURE SETTINGS:  min 5, max 15 cm H2O  LEAK: acceptable at 28 L/min  DME:  wireless with iSLEEP      TYPE:  HST through [facility]  DATE:  10/10/2023, 10/11/2023  Diagnostic:AHI:  12.5, 21.0  Diagnostic  Oxygen Sean: 76.4% with 2.1 mins at or under 88% , 53.2% with 12.8 mins at or under 88%    Significant comorbidities and modifying factors: " see below    PAST MEDICAL HISTORY:  Past Medical History:   Diagnosis Date    Allergic rhinitis     Back pain     Bronchitis     Chickenpox     Frequent headaches     Hypertension     Hypothyroidism     Influenza     Insomnia     Irregular periods     Lupus (HCC) 2010    Menometrorrhagia     Migraine     Morning headache     Nasal drainage     Painful joint     Rash     Rhinitis     Sleep apnea     Sore muscles     Swelling of lower extremity     Systemic lupus (HCC)     Tonsillitis     Wears glasses       PROBLEM LIST:  Patient Active Problem List    Diagnosis Date Noted    BRBPR (bright red blood per rectum) 2023    Complete fecal incontinence 2023    Encounter for diagnostic colonoscopy due to change in bowel habits 2023    Hematochezia 2023    Hypertension 2023    Hypothyroidism 2023    Migraines 2023    Mucus in stool 2023    Lazaro syndrome (HCC) 2023    Connective tissue disease (HCC) 2023    Change in bowel function 2023    Angiodysplasia of colon without hemorrhage 10/19/2017     PAST SOCIAL HISTORY:  Past Surgical History:   Procedure Laterality Date    APPENDECTOMY      ARTHROSCOPY, KNEE      CARPAL TUNNEL RELEASE      PRIMARY C SECTION      SEPTOPLASTY      SINUSCOPE      TONSILLECTOMY       PAST FAMILY HISTORY:  Family History   Problem Relation Age of Onset    Cancer Mother             Cancer Father             Stroke Father             No Known Problems Son     No Known Problems Son     No Known Problems Son     Cancer Maternal Grandfather             Cancer Paternal Grandfather             Stroke Paternal Grandmother             Alzheimer's Disease Paternal Aunt         Gert     Cancer Paternal Aunt             Cancer Paternal Aunt             Cancer Paternal Uncle             Cancer Paternal Uncle              SOCIAL HISTORY:  Social History      Socioeconomic History    Marital status:      Spouse name: Not on file    Number of children: Not on file    Years of education: Not on file    Highest education level: Not on file   Occupational History    Not on file   Tobacco Use    Smoking status: Never    Smokeless tobacco: Never    Tobacco comments:     Both parents and step father smoked. Several other family members also smoked   Vaping Use    Vaping Use: Never used   Substance and Sexual Activity    Alcohol use: Yes     Comment: socail maybe 5 per month    Drug use: Never    Sexual activity: Yes     Partners: Male     Comment: .    Other Topics Concern    Not on file   Social History Narrative    Not on file     Social Determinants of Health     Financial Resource Strain: Not on file   Food Insecurity: Not on file   Transportation Needs: Not on file   Physical Activity: Not on file   Stress: Not on file   Social Connections: Not on file   Intimate Partner Violence: Not on file   Housing Stability: Not on file     ALLERGIES: Sumatriptan, Food, Galcanezumab-gnlm, Keflex, Penicillins, and Sulfa drugs  MEDICATIONS:  Current Outpatient Medications   Medication Sig Dispense Refill    losartan-hydrochlorothiazide (HYZAAR) 100-25 MG per tablet Take 1 Tablet by mouth every day.      QULIPTA 30 MG Tab Take 1 Tablet by mouth every day.      Calcium Carb-Cholecalciferol (CALCIUM 500 +D) 500-10 MG-MCG Tab Take  by mouth.      levothyroxine (SYNTHROID) 75 MCG Tab Take 1 Tablet by mouth every day.      valacyclovir (VALTREX) 1 GM Tab       fluticasone (FLONASE) 50 MCG/ACT nasal spray Administer 1 Spray into affected nostril(S) every day. 16 g 0    metoclopramide (REGLAN) 10 MG Tab Take 1 tablet by mouth 4 times a day as needed (Nausea or headache). 20 tablet 0    prochlorperazine (COMPAZINE) 10 MG Tab Take 1 Tab by mouth every 6 hours as needed for Nausea/Vomiting. 30 Tab 3    tizanidine (ZANAFLEX) 4 MG Tab Take 1 Tab by mouth every 6 hours as needed. 30  "Tab 0    cyclobenzaprine (FLEXERIL) 10 MG Tab cyclobenzaprine 10 mg tablet      nabumetone (RELAFEN) 750 MG Tab TK 2 TS PO QD      hydroxychloroquine (PLAQUENIL) 200 MG Tab hydroxychloroquine 200 mg tablet      onabotulinum toxin type A (BOTOX) 200 units Recon Soln Botox 200 unit injection      Diclofenac Potassium (CAMBIA) 50 MG Pack Cambia 50 mg oral powder packet   Take 1 packet every day by oral route as needed.      azaTHIOprine (IMURAN) 50 MG Tab TK 3 TS PO D      montelukast (SINGULAIR) 10 MG Tab montelukast 10 mg tablet      lidocaine (LIDODERM) 5 % Patch TAKE 1 TOPICAL TRANSDERMAL PATCH ONCE A DAY X 12 HOURS THEN REMOVE FOR 12HRS      FLECTOR 1.3 % Patch TAKE 1 TRANSDERMAL TRANSDERMAL PATCH 2 TIMES A DAY ONLY BRAND COVERED BY INSURANCE DW2      fluocinonide (LIDEX) 0.05 % external solution AAA scalp Qday-BID PRN itching. Do not use on face, axilla, groin. (Patient not taking: Reported on 3/7/2024) 60 mL 2    ketorolac (TORADOL) 10 MG Tab Take 1 Tab by mouth 3 times a day as needed for Moderate Pain. (Patient not taking: Reported on 3/7/2024) 15 Tab 0    losartan (COZAAR) 100 MG Tab  (Patient not taking: Reported on 3/7/2024)      hydroCHLOROthiazide (HYDRODIURIL) 12.5 MG tablet hydrochlorothiazide 12.5 mg tablet (Patient not taking: Reported on 3/7/2024)       No current facility-administered medications for this visit.    \"CURRENT RX\"    REVIEW OF SYSTEMS:  Constitutional: Denies weight loss, endorses chronic daytime fatigue --also see HPI    PHYSICAL EXAM/VITALS:  /70 (BP Location: Left arm, Patient Position: Sitting, BP Cuff Size: Large adult)   Pulse 77   Resp 16   Ht 1.626 m (5' 4\")   Wt 93.9 kg (207 lb)   LMP 04/15/2021 (Approximate)   SpO2 96%   BMI 35.53 kg/m²   Neck circumference (inches): 16  Appearance: Well-nourished, well-developed,  looks stated age, no acute distress  Eyes:   EOMI  ENMT:   Hard palate narrow: No   Hard palate high: No   Soft palate/uvula (Mallampati score): " 4  Tongue Scalloping: No   Retrognathia:  No   Micrognathia:  No    Neck: Supple, trachea midline  Respiratory effort:  No intercostal retractions or use of accessory muscles  Lung auscultation:  No wheezes rhonchi rubs or rales  Cardiac: No murmurs, rubs, or gallops; regular rhythm, normal rate; no edema  Musculoskeletal:  Grossly normal; gait and station normal  Neurologic:  oriented to person, time, place, and purpose; judgement intact  Psychiatric:  No depression, anxiety, agitation    MEDICAL DECISION MAKING:  The medical record was reviewed as it pertains to this referral. This includes records from primary care,consultants notes, referral request, hospital records, labs and imaging. Any available diagnostic and titration nocturnal polysomnograms, home sleep apnea tests, continuous nocturnal oximetry results, multiple sleep latency tests, and recent compliance reports were reviewed with the patient.    ASSESSMENT/PLAN:  Eveline Galo is a 58 y.o. female  who has been diagnosed with mild to moderate obstructive sleep apnea.  The patient is doing well on PAP therapy and has met all insurance requirement for compliance.      DIAGNOSES :    1. KANWAL on CPAP  - DME Mask and Supplies    Other orders  - losartan-hydrochlorothiazide (HYZAAR) 100-25 MG per tablet; Take 1 Tablet by mouth every day.  - QULIPTA 30 MG Tab; Take 1 Tablet by mouth every day.  - Calcium Carb-Cholecalciferol (CALCIUM 500 +D) 500-10 MG-MCG Tab; Take  by mouth.    The patient has signs and symptoms consistent with obstructive sleep apnea hypopnea syndrome. Will schedule a nocturnal polysomnogram or a home sleep apnea test (please see plan).  The risks of untreated sleep apnea were discussed with the patient at length. Patients with KANWAL are at increased risk of cardiovascular disease including coronary artery disease, systemic arterial hypertension, pulmonary arterial hypertension, cardiac arrhythmias, and stroke. KANWAL patients have an increased  risk of motor vehicle accidents, type 2 diabetes, chronic kidney disease, and non-alcoholic liver disease. The patient was advised to avoid driving a motor vehicle when drowsy.  Have advised the patient to follow up with the appropriate healthcare practitioners for all other medical problems and issues.    RETURN TO CLINIC: Return in about 1 year (around 3/7/2025) for Annual visit.    My total time spent caring for the patient on the day of the encounter was 40 minutes. This includes time spent on a thorough chart review including other physician notes, all sleep studies, as well as critical labs and pulmonary and cardiac studies.  Additionally, it includes a thorough discussion of good sleep hygiene and stimulus control, as well as  the need for consistency in terms of sleep preparation and practice.    Please note that this dictation was created using voice recognition software.  I have made every reasonable attempt to correct obvious errors, I expect that there are errors of grammar and possibly content that I did not discover before finalizing this note.                        Answers submitted by the patient for this visit:  Sleep Center Questionnaire (Submitted on 3/6/2024)  Year of your last physical exam:   Results of exam: Good  Occupation : PAR  Height: 5’4”  Current weight: 200  6 months ago: 190  At age 22: 125  What is the reason for your visit today?: Per [facility] recommendation  Name of person referring you to the Sleep Center: Razia Su  Have you ever been hospitalized?: Yes  Reason, year, and hospital in which you were hospitalized:: Last marcello night   birth. Last ER visit   Have you ever had problems with anesthesia?: Yes  Have you experienced post-operative delirium?: No  Any complications with surgery?: No  What year did you receive your last Flu shot?:   Have you had a TB skin test? If so, please list the year and result::  negative  Have you had Allergy skin testing?  If so, please list the year and result:: Yes 2003 mold dust all plant tree weed groups coconut  Please briefly describe your sleep problem and how old you were when it began.: Dx in 2023 58  How does this affect your daily life and activities? Please also rate how serious of a problem this is (1 = Not at all, 10 = Very Serious).: Did not know i had one. Thought i was tired from lupus  Have you had any previous evaluations, examinations, or treatment for this sleep problem or any other problems with your sleep? If so, please describe the evaluation, treatment, and results.: Home study Oct 2023 started with machine January 2024  If employed, what time do you usually start and end work?: 8-4:30  Do you ever change work shifts? If yes, describe how often (never, infrequently, regularly).: At times i will start at 7am and end at 5:30p  What time do you usually go to bed and wake up on: Weekdays? Weekends?: 9-10pm both sometimes 11  Do you have a regular bed partner?: Yes  How many minutes does it usually take to fall asleep at night after turning off the lights?: 10-15 minutes  What do you ordinarily do just prior to turning out the lights and attempting to go to sleep (e.g., reading, TV, baths, etc.)?: Wash face brush teeth put up hair adjust dog turn on heating pad  On average, how many times do you wake up during the night?: 2-3  On average, how many times do you wake up to use the bathroom?: 2-3  Do you often wake up too early in the morning and are unable to return to sleep?: Sometimes maybe 1-2 per week  On average, how many hours of sleep do you get per night?: 7-8  How do you usually awaken?  Alarm, spontaneously, or other?: Alarm  Is it difficult for you to awaken and get out of bed after sleeping? (Not at all, Sometimes, Very): Sometimes usually due to body aches.  Do you nap or return to bed after arising?: Infrequently  Are you bothered by sleepiness during the day?: Yes maybe 1-2 per week.  Do you feel that  "you get too much sleep at night?: No  Do you feel that you get too little sleep at night?: At times  Do you usually feel tired during the day? If so, what do you attribute this to?: Mainly due to lupus  Do you find yourself falling asleep when you don't mean to? : At times especially during movies  If yes, how long does your sleep episode last?: 1-3 hrs  Do you feel rested or refreshed after the sleep episode?: Yes  Have you ever suddenly fallen?: Yes  Have you ever experienced sudden body weakness?: Yes  If yes, were you aware of the things around you?: Yes  Have you ever experienced weakness or paralysis upon going to sleep?: No  Have you ever experienced weakness or paralysis upon awakening from sleep?: No  Have you ever experienced seeing things or hearing voices/noises: That weren't real? On going to sleep? During the night? On awakening from sleep? During the day?: No  Do you have difficulty breathing at night? If yes, briefly describe.: No  Have you been told you snore while asleep? If so, does it disturb a bed partner (or someone in the same room), or someone in the next room?: Yes mainly when i take flexeril  Have you ever experienced doing something without being aware of the action? If yes, please describe.: No  Have you ever experienced upon lying in bed before sleep or on awakening from sleep: Restlessness of legs, \"nervous legs\", \"creeping crawling\" sensation of legs, or twitching of legs?: No  Have you ever been told that your arms or legs jerk or twitch while you are asleep? If yes, how many times per night does this occur?: No  Do you know, or have you ever been told that you do any of the following while sleeping: talk, walk, grit teeth, wet the bed, wake up screaming or seemingly afraid, have disturbing dreams, have unusual movements, wake up with headaches, (males) have erections? If yes to any of these, please indicate how many times per week, age started, last occurrence, treatment received.: " Migraines up to at least 3x per week grind teeth  Has anyone in your family been known to have any sleep problems? If yes, please list the type of problem (e.g., trouble getting to sleep, too sleepy, bed wetting, etc.), the relationship of this person to you, and the treatment received.: Unknown

## 2024-05-23 ENCOUNTER — HOSPITAL ENCOUNTER (OUTPATIENT)
Facility: MEDICAL CENTER | Age: 59
End: 2024-05-23
Attending: INTERNAL MEDICINE
Payer: COMMERCIAL

## 2024-05-24 LAB
ALBUMIN SERPL BCP-MCNC: 4.4 G/DL (ref 3.2–4.9)
ALBUMIN/GLOB SERPL: 1.9 G/DL
ALP SERPL-CCNC: 94 U/L (ref 30–99)
ALT SERPL-CCNC: 28 U/L (ref 2–50)
AMBIGUOUS DTTM AMBI4: NORMAL
ANION GAP SERPL CALC-SCNC: 13 MMOL/L (ref 7–16)
AST SERPL-CCNC: 25 U/L (ref 12–45)
BILIRUB SERPL-MCNC: 0.3 MG/DL (ref 0.1–1.5)
BUN SERPL-MCNC: 15 MG/DL (ref 8–22)
CALCIUM ALBUM COR SERPL-MCNC: 9.3 MG/DL (ref 8.5–10.5)
CALCIUM SERPL-MCNC: 9.6 MG/DL (ref 8.5–10.5)
CHLORIDE SERPL-SCNC: 107 MMOL/L (ref 96–112)
CO2 SERPL-SCNC: 24 MMOL/L (ref 20–33)
CREAT SERPL-MCNC: 0.77 MG/DL (ref 0.5–1.4)
GFR SERPLBLD CREATININE-BSD FMLA CKD-EPI: 90 ML/MIN/1.73 M 2
GLOBULIN SER CALC-MCNC: 2.3 G/DL (ref 1.9–3.5)
GLUCOSE SERPL-MCNC: 90 MG/DL (ref 65–99)
POTASSIUM SERPL-SCNC: 4.4 MMOL/L (ref 3.6–5.5)
PROT SERPL-MCNC: 6.7 G/DL (ref 6–8.2)
SODIUM SERPL-SCNC: 144 MMOL/L (ref 135–145)

## 2024-06-14 ENCOUNTER — HOSPITAL ENCOUNTER (OUTPATIENT)
Dept: LAB | Facility: MEDICAL CENTER | Age: 59
End: 2024-06-14
Attending: INTERNAL MEDICINE
Payer: COMMERCIAL

## 2024-06-14 LAB
ALBUMIN SERPL BCP-MCNC: 4.3 G/DL (ref 3.2–4.9)
ALP SERPL-CCNC: 104 U/L (ref 30–99)
ALT SERPL-CCNC: 47 U/L (ref 2–50)
APPEARANCE UR: CLEAR
AST SERPL-CCNC: 39 U/L (ref 12–45)
BASOPHILS # BLD AUTO: 0.9 % (ref 0–1.8)
BASOPHILS # BLD: 0.06 K/UL (ref 0–0.12)
BILIRUB CONJ SERPL-MCNC: <0.2 MG/DL (ref 0.1–0.5)
BILIRUB INDIRECT SERPL-MCNC: ABNORMAL MG/DL (ref 0–1)
BILIRUB SERPL-MCNC: 0.4 MG/DL (ref 0.1–1.5)
BILIRUB UR QL STRIP.AUTO: NEGATIVE
C3 SERPL-MCNC: 145.3 MG/DL (ref 87–200)
C4 SERPL-MCNC: 23.4 MG/DL (ref 19–52)
COLOR UR: YELLOW
CREAT SERPL-MCNC: 0.88 MG/DL (ref 0.5–1.4)
CREAT UR-MCNC: 57.85 MG/DL
CRP SERPL HS-MCNC: 0.61 MG/DL (ref 0–0.75)
EOSINOPHIL # BLD AUTO: 0.17 K/UL (ref 0–0.51)
EOSINOPHIL NFR BLD: 2.5 % (ref 0–6.9)
ERYTHROCYTE [DISTWIDTH] IN BLOOD BY AUTOMATED COUNT: 43.8 FL (ref 35.9–50)
ERYTHROCYTE [SEDIMENTATION RATE] IN BLOOD BY WESTERGREN METHOD: 8 MM/HOUR (ref 0–25)
GFR SERPLBLD CREATININE-BSD FMLA CKD-EPI: 77 ML/MIN/1.73 M 2
GLUCOSE UR STRIP.AUTO-MCNC: NEGATIVE MG/DL
HCT VFR BLD AUTO: 46.4 % (ref 37–47)
HGB BLD-MCNC: 15.7 G/DL (ref 12–16)
IMM GRANULOCYTES # BLD AUTO: 0.06 K/UL (ref 0–0.11)
IMM GRANULOCYTES NFR BLD AUTO: 0.9 % (ref 0–0.9)
KETONES UR STRIP.AUTO-MCNC: NEGATIVE MG/DL
LEUKOCYTE ESTERASE UR QL STRIP.AUTO: NEGATIVE
LYMPHOCYTES # BLD AUTO: 1.68 K/UL (ref 1–4.8)
LYMPHOCYTES NFR BLD: 25.1 % (ref 22–41)
MCH RBC QN AUTO: 30 PG (ref 27–33)
MCHC RBC AUTO-ENTMCNC: 33.8 G/DL (ref 32.2–35.5)
MCV RBC AUTO: 88.5 FL (ref 81.4–97.8)
MICRO URNS: NORMAL
MONOCYTES # BLD AUTO: 0.42 K/UL (ref 0–0.85)
MONOCYTES NFR BLD AUTO: 6.3 % (ref 0–13.4)
NEUTROPHILS # BLD AUTO: 4.3 K/UL (ref 1.82–7.42)
NEUTROPHILS NFR BLD: 64.3 % (ref 44–72)
NITRITE UR QL STRIP.AUTO: NEGATIVE
NRBC # BLD AUTO: 0 K/UL
NRBC BLD-RTO: 0 /100 WBC (ref 0–0.2)
PH UR STRIP.AUTO: 6 [PH] (ref 5–8)
PLATELET # BLD AUTO: 351 K/UL (ref 164–446)
PMV BLD AUTO: 9.7 FL (ref 9–12.9)
PROT SERPL-MCNC: 7 G/DL (ref 6–8.2)
PROT UR QL STRIP: NEGATIVE MG/DL
PROT UR-MCNC: 5 MG/DL (ref 0–15)
PROT/CREAT UR: 86 MG/G (ref 10–107)
RBC # BLD AUTO: 5.24 M/UL (ref 4.2–5.4)
RBC UR QL AUTO: NEGATIVE
SP GR UR STRIP.AUTO: 1.01
UROBILINOGEN UR STRIP.AUTO-MCNC: 0.2 MG/DL
WBC # BLD AUTO: 6.7 K/UL (ref 4.8–10.8)

## 2024-06-14 PROCEDURE — 84156 ASSAY OF PROTEIN URINE: CPT

## 2024-06-14 PROCEDURE — 80076 HEPATIC FUNCTION PANEL: CPT

## 2024-06-14 PROCEDURE — 81003 URINALYSIS AUTO W/O SCOPE: CPT

## 2024-06-14 PROCEDURE — 85652 RBC SED RATE AUTOMATED: CPT

## 2024-06-14 PROCEDURE — 85025 COMPLETE CBC W/AUTO DIFF WBC: CPT

## 2024-06-14 PROCEDURE — 82570 ASSAY OF URINE CREATININE: CPT

## 2024-06-14 PROCEDURE — 36415 COLL VENOUS BLD VENIPUNCTURE: CPT

## 2024-06-14 PROCEDURE — 86160 COMPLEMENT ANTIGEN: CPT | Mod: 91

## 2024-06-14 PROCEDURE — 82565 ASSAY OF CREATININE: CPT

## 2024-06-14 PROCEDURE — 86140 C-REACTIVE PROTEIN: CPT

## 2024-08-22 ENCOUNTER — HOSPITAL ENCOUNTER (OUTPATIENT)
Dept: LAB | Facility: MEDICAL CENTER | Age: 59
End: 2024-08-22
Attending: NURSE PRACTITIONER
Payer: COMMERCIAL

## 2024-08-22 LAB
ALBUMIN SERPL BCP-MCNC: 4.3 G/DL (ref 3.2–4.9)
ALBUMIN/GLOB SERPL: 1.7 G/DL
ALP SERPL-CCNC: 100 U/L (ref 30–99)
ALT SERPL-CCNC: 21 U/L (ref 2–50)
ANION GAP SERPL CALC-SCNC: 11 MMOL/L (ref 7–16)
AST SERPL-CCNC: 22 U/L (ref 12–45)
BILIRUB SERPL-MCNC: 0.5 MG/DL (ref 0.1–1.5)
BUN SERPL-MCNC: 15 MG/DL (ref 8–22)
CALCIUM ALBUM COR SERPL-MCNC: 8.9 MG/DL (ref 8.5–10.5)
CALCIUM SERPL-MCNC: 9.1 MG/DL (ref 8.5–10.5)
CHLORIDE SERPL-SCNC: 106 MMOL/L (ref 96–112)
CHOLEST SERPL-MCNC: 195 MG/DL (ref 100–199)
CO2 SERPL-SCNC: 24 MMOL/L (ref 20–33)
CREAT SERPL-MCNC: 0.85 MG/DL (ref 0.5–1.4)
FASTING STATUS PATIENT QL REPORTED: NORMAL
GFR SERPLBLD CREATININE-BSD FMLA CKD-EPI: 80 ML/MIN/1.73 M 2
GLOBULIN SER CALC-MCNC: 2.5 G/DL (ref 1.9–3.5)
GLUCOSE SERPL-MCNC: 85 MG/DL (ref 65–99)
HDLC SERPL-MCNC: 51 MG/DL
LDLC SERPL CALC-MCNC: 121 MG/DL
POTASSIUM SERPL-SCNC: 3.7 MMOL/L (ref 3.6–5.5)
PROT SERPL-MCNC: 6.8 G/DL (ref 6–8.2)
SODIUM SERPL-SCNC: 141 MMOL/L (ref 135–145)
T4 FREE SERPL-MCNC: 1.38 NG/DL (ref 0.93–1.7)
TRIGL SERPL-MCNC: 113 MG/DL (ref 0–149)
TSH SERPL-ACNC: 2.07 UIU/ML (ref 0.35–5.5)

## 2024-08-22 PROCEDURE — 80053 COMPREHEN METABOLIC PANEL: CPT

## 2024-08-22 PROCEDURE — 80061 LIPID PANEL: CPT

## 2024-08-22 PROCEDURE — 84439 ASSAY OF FREE THYROXINE: CPT

## 2024-08-22 PROCEDURE — 36415 COLL VENOUS BLD VENIPUNCTURE: CPT

## 2024-08-22 PROCEDURE — 84443 ASSAY THYROID STIM HORMONE: CPT

## 2024-09-06 ENCOUNTER — OFFICE VISIT (OUTPATIENT)
Dept: DERMATOLOGY | Facility: IMAGING CENTER | Age: 59
End: 2024-09-06
Payer: COMMERCIAL

## 2024-09-06 DIAGNOSIS — M35.9 CONNECTIVE TISSUE DISEASE (HCC): ICD-10-CM

## 2024-09-06 DIAGNOSIS — L82.1 SEBORRHEIC KERATOSIS: ICD-10-CM

## 2024-09-06 DIAGNOSIS — L21.9 SEBORRHEA: ICD-10-CM

## 2024-09-06 DIAGNOSIS — B35.3 TINEA PEDIS OF BOTH FEET: ICD-10-CM

## 2024-09-06 DIAGNOSIS — Z12.83 SKIN CANCER SCREENING: ICD-10-CM

## 2024-09-06 DIAGNOSIS — D22.9 NEVUS: ICD-10-CM

## 2024-09-06 DIAGNOSIS — L90.8 SKIN AGING: ICD-10-CM

## 2024-09-06 DIAGNOSIS — L81.4 LENTIGO: ICD-10-CM

## 2024-09-06 PROCEDURE — 99213 OFFICE O/P EST LOW 20 MIN: CPT | Performed by: DERMATOLOGY

## 2024-09-06 RX ORDER — CLOTRIMAZOLE AND BETAMETHASONE DIPROPIONATE 10; .64 MG/G; MG/G
CREAM TOPICAL
Qty: 45 G | Refills: 0 | Status: SHIPPED | OUTPATIENT
Start: 2024-09-06

## 2024-09-06 NOTE — PROGRESS NOTES
CC: RUSS    Subjective:Patient here for RUSS    Scaling on feet for several weeks - had tried ketoconazole cream without relief.    HPI: skin lesion   Location: scalp   Time present: 3 months   Painful lesion: No  Itching lesion: No  Enlarging lesion: No  Anything make it better or worse?no     Hx Lupus  History of skin cancer: No  History of precancers/actinic keratoses: No  History of biopsies:Yes, Details: 25 years ago Benign  History of blistering/severe sunburns:Yes, Details: Childhood  Family history of skin cancer:Yes, Details: Maternal Aunt unknown type  Family history of atypical moles:No    History of cold sores    ROS: no fevers/chills. No itch.  No cough  Relevant PMH:hx lupus - azathioprine/plaquenil  Social:NS    PE: Gen:WDWN female in NAD. Skin: Scalp/face/eyes/lips/neck/chest/back/arms/legs/hands/feet/buttocks - without suspicious lesions noted.  Genitals exam declined  -scattered hyperpigmented macules/papules, appearing benign on torso and extremities  -scant desquamative scaling of bilateral feet    A/P: oral HSV: prev reviewed prevention strategies    Hx seborrhea,scalp:  -prev counseled re: dx/tx, cont care:  -antidandruff shampoos - pick 2 and alternate Q3-4 days   -lidex solution Qday-BID--> PRN    Nevi: benign appearing:  -Reviewed ABCDEs, handout supplied  -Reviewed skin cancer detection/prevention  -RTC PRN growth/changes/concerning features    Lentigos/SKs: benign  -reassurance  -reviewed skin cancer detection/prevention    Tinea pedis vs xerosis, feet:  -trial lotrisone cream BID X 3 weeks  -f/u if persists/worsening    Annual skin check    I have reviewed medications relevant to my specialty.

## 2024-12-16 ENCOUNTER — HOSPITAL ENCOUNTER (OUTPATIENT)
Dept: LAB | Facility: MEDICAL CENTER | Age: 59
End: 2024-12-16
Attending: INTERNAL MEDICINE
Payer: COMMERCIAL

## 2024-12-16 LAB
ALBUMIN SERPL BCP-MCNC: 4.4 G/DL (ref 3.2–4.9)
ALP SERPL-CCNC: 107 U/L (ref 30–99)
ALT SERPL-CCNC: 18 U/L (ref 2–50)
APPEARANCE UR: CLEAR
AST SERPL-CCNC: 21 U/L (ref 12–45)
BASOPHILS # BLD AUTO: 1.2 % (ref 0–1.8)
BASOPHILS # BLD: 0.08 K/UL (ref 0–0.12)
BILIRUB CONJ SERPL-MCNC: <0.2 MG/DL (ref 0.1–0.5)
BILIRUB INDIRECT SERPL-MCNC: ABNORMAL MG/DL (ref 0–1)
BILIRUB SERPL-MCNC: 0.6 MG/DL (ref 0.1–1.5)
BILIRUB UR QL STRIP.AUTO: NEGATIVE
C3 SERPL-MCNC: 162.1 MG/DL (ref 87–200)
C4 SERPL-MCNC: 23.9 MG/DL (ref 19–52)
COLOR UR: YELLOW
CREAT SERPL-MCNC: 0.83 MG/DL (ref 0.5–1.4)
CREAT UR-MCNC: 35.5 MG/DL
EOSINOPHIL # BLD AUTO: 0.1 K/UL (ref 0–0.51)
EOSINOPHIL NFR BLD: 1.5 % (ref 0–6.9)
ERYTHROCYTE [DISTWIDTH] IN BLOOD BY AUTOMATED COUNT: 42.6 FL (ref 35.9–50)
ERYTHROCYTE [SEDIMENTATION RATE] IN BLOOD BY WESTERGREN METHOD: 7 MM/HOUR (ref 0–25)
GFR SERPLBLD CREATININE-BSD FMLA CKD-EPI: 82 ML/MIN/1.73 M 2
GLUCOSE UR STRIP.AUTO-MCNC: NEGATIVE MG/DL
HCT VFR BLD AUTO: 47.9 % (ref 37–47)
HGB BLD-MCNC: 16.2 G/DL (ref 12–16)
IMM GRANULOCYTES # BLD AUTO: 0.04 K/UL (ref 0–0.11)
IMM GRANULOCYTES NFR BLD AUTO: 0.6 % (ref 0–0.9)
KETONES UR STRIP.AUTO-MCNC: NEGATIVE MG/DL
LEUKOCYTE ESTERASE UR QL STRIP.AUTO: NEGATIVE
LYMPHOCYTES # BLD AUTO: 1.5 K/UL (ref 1–4.8)
LYMPHOCYTES NFR BLD: 22.4 % (ref 22–41)
MCH RBC QN AUTO: 30.1 PG (ref 27–33)
MCHC RBC AUTO-ENTMCNC: 33.8 G/DL (ref 32.2–35.5)
MCV RBC AUTO: 88.9 FL (ref 81.4–97.8)
MICRO URNS: NORMAL
MONOCYTES # BLD AUTO: 0.38 K/UL (ref 0–0.85)
MONOCYTES NFR BLD AUTO: 5.7 % (ref 0–13.4)
NEUTROPHILS # BLD AUTO: 4.6 K/UL (ref 1.82–7.42)
NEUTROPHILS NFR BLD: 68.6 % (ref 44–72)
NITRITE UR QL STRIP.AUTO: NEGATIVE
NRBC # BLD AUTO: 0 K/UL
NRBC BLD-RTO: 0 /100 WBC (ref 0–0.2)
PH UR STRIP.AUTO: 6.5 [PH] (ref 5–8)
PLATELET # BLD AUTO: 348 K/UL (ref 164–446)
PMV BLD AUTO: 8.9 FL (ref 9–12.9)
PROT SERPL-MCNC: 6.8 G/DL (ref 6–8.2)
PROT UR QL STRIP: NEGATIVE MG/DL
PROT UR-MCNC: <6 MG/DL (ref 0–15)
PROT/CREAT UR: 169 MG/G (ref 10–107)
RBC # BLD AUTO: 5.39 M/UL (ref 4.2–5.4)
RBC UR QL AUTO: NEGATIVE
SP GR UR STRIP.AUTO: 1.01
UROBILINOGEN UR STRIP.AUTO-MCNC: 0.2 EU/DL
WBC # BLD AUTO: 6.7 K/UL (ref 4.8–10.8)

## 2024-12-16 PROCEDURE — 86160 COMPLEMENT ANTIGEN: CPT | Mod: 91

## 2024-12-16 PROCEDURE — 81003 URINALYSIS AUTO W/O SCOPE: CPT

## 2024-12-16 PROCEDURE — 82570 ASSAY OF URINE CREATININE: CPT

## 2024-12-16 PROCEDURE — 36415 COLL VENOUS BLD VENIPUNCTURE: CPT

## 2024-12-16 PROCEDURE — 85652 RBC SED RATE AUTOMATED: CPT

## 2024-12-16 PROCEDURE — 84156 ASSAY OF PROTEIN URINE: CPT

## 2024-12-16 PROCEDURE — 85025 COMPLETE CBC W/AUTO DIFF WBC: CPT

## 2024-12-16 PROCEDURE — 80076 HEPATIC FUNCTION PANEL: CPT

## 2024-12-16 PROCEDURE — 82565 ASSAY OF CREATININE: CPT

## 2025-02-12 ENCOUNTER — HOSPITAL ENCOUNTER (OUTPATIENT)
Dept: RADIOLOGY | Facility: MEDICAL CENTER | Age: 60
End: 2025-02-12
Attending: OBSTETRICS & GYNECOLOGY
Payer: COMMERCIAL

## 2025-02-12 DIAGNOSIS — R92.2 INCONCLUSIVE MAMMOGRAM DUE TO DENSE BREASTS: ICD-10-CM

## 2025-02-12 DIAGNOSIS — R92.30 INCONCLUSIVE MAMMOGRAM DUE TO DENSE BREASTS: ICD-10-CM

## 2025-02-12 PROCEDURE — 77067 SCR MAMMO BI INCL CAD: CPT

## 2025-02-12 PROCEDURE — 76641 ULTRASOUND BREAST COMPLETE: CPT

## 2025-03-21 ENCOUNTER — HOSPITAL ENCOUNTER (OUTPATIENT)
Dept: LAB | Facility: MEDICAL CENTER | Age: 60
End: 2025-03-21
Attending: NURSE PRACTITIONER
Payer: COMMERCIAL

## 2025-03-21 LAB
ALBUMIN SERPL BCP-MCNC: 4.1 G/DL (ref 3.2–4.9)
ALBUMIN/GLOB SERPL: 1.6 G/DL
ALP SERPL-CCNC: 94 U/L (ref 30–99)
ALT SERPL-CCNC: 22 U/L (ref 2–50)
ANION GAP SERPL CALC-SCNC: 13 MMOL/L (ref 7–16)
AST SERPL-CCNC: 25 U/L (ref 12–45)
BASOPHILS # BLD AUTO: 1.2 % (ref 0–1.8)
BASOPHILS # BLD: 0.07 K/UL (ref 0–0.12)
BILIRUB SERPL-MCNC: 0.5 MG/DL (ref 0.1–1.5)
BUN SERPL-MCNC: 10 MG/DL (ref 8–22)
CALCIUM ALBUM COR SERPL-MCNC: 8.9 MG/DL (ref 8.5–10.5)
CALCIUM SERPL-MCNC: 9 MG/DL (ref 8.5–10.5)
CHLORIDE SERPL-SCNC: 105 MMOL/L (ref 96–112)
CHOLEST SERPL-MCNC: 176 MG/DL (ref 100–199)
CO2 SERPL-SCNC: 22 MMOL/L (ref 20–33)
CREAT SERPL-MCNC: 0.89 MG/DL (ref 0.5–1.4)
EOSINOPHIL # BLD AUTO: 0.1 K/UL (ref 0–0.51)
EOSINOPHIL NFR BLD: 1.7 % (ref 0–6.9)
ERYTHROCYTE [DISTWIDTH] IN BLOOD BY AUTOMATED COUNT: 43.8 FL (ref 35.9–50)
EST. AVERAGE GLUCOSE BLD GHB EST-MCNC: 100 MG/DL
GFR SERPLBLD CREATININE-BSD FMLA CKD-EPI: 75 ML/MIN/1.73 M 2
GLOBULIN SER CALC-MCNC: 2.6 G/DL (ref 1.9–3.5)
GLUCOSE SERPL-MCNC: 82 MG/DL (ref 65–99)
HBA1C MFR BLD: 5.1 % (ref 4–5.6)
HCT VFR BLD AUTO: 47.9 % (ref 37–47)
HDLC SERPL-MCNC: 51 MG/DL
HGB BLD-MCNC: 15.8 G/DL (ref 12–16)
IMM GRANULOCYTES # BLD AUTO: 0.03 K/UL (ref 0–0.11)
IMM GRANULOCYTES NFR BLD AUTO: 0.5 % (ref 0–0.9)
LDLC SERPL CALC-MCNC: 103 MG/DL
LYMPHOCYTES # BLD AUTO: 1.64 K/UL (ref 1–4.8)
LYMPHOCYTES NFR BLD: 27.9 % (ref 22–41)
MCH RBC QN AUTO: 29.6 PG (ref 27–33)
MCHC RBC AUTO-ENTMCNC: 33 G/DL (ref 32.2–35.5)
MCV RBC AUTO: 89.9 FL (ref 81.4–97.8)
MONOCYTES # BLD AUTO: 0.45 K/UL (ref 0–0.85)
MONOCYTES NFR BLD AUTO: 7.7 % (ref 0–13.4)
NEUTROPHILS # BLD AUTO: 3.59 K/UL (ref 1.82–7.42)
NEUTROPHILS NFR BLD: 61 % (ref 44–72)
NRBC # BLD AUTO: 0 K/UL
NRBC BLD-RTO: 0 /100 WBC (ref 0–0.2)
PLATELET # BLD AUTO: 339 K/UL (ref 164–446)
PMV BLD AUTO: 9.5 FL (ref 9–12.9)
POTASSIUM SERPL-SCNC: 4.3 MMOL/L (ref 3.6–5.5)
PROT SERPL-MCNC: 6.7 G/DL (ref 6–8.2)
RBC # BLD AUTO: 5.33 M/UL (ref 4.2–5.4)
SODIUM SERPL-SCNC: 140 MMOL/L (ref 135–145)
T4 FREE SERPL-MCNC: 1.44 NG/DL (ref 0.93–1.7)
TRIGL SERPL-MCNC: 108 MG/DL (ref 0–149)
TSH SERPL-ACNC: 3 UIU/ML (ref 0.35–5.5)
WBC # BLD AUTO: 5.9 K/UL (ref 4.8–10.8)

## 2025-03-21 PROCEDURE — 84443 ASSAY THYROID STIM HORMONE: CPT

## 2025-03-21 PROCEDURE — 85025 COMPLETE CBC W/AUTO DIFF WBC: CPT

## 2025-03-21 PROCEDURE — 83036 HEMOGLOBIN GLYCOSYLATED A1C: CPT

## 2025-03-21 PROCEDURE — 84439 ASSAY OF FREE THYROXINE: CPT

## 2025-03-21 PROCEDURE — 80053 COMPREHEN METABOLIC PANEL: CPT

## 2025-03-21 PROCEDURE — 36415 COLL VENOUS BLD VENIPUNCTURE: CPT

## 2025-03-21 PROCEDURE — 80061 LIPID PANEL: CPT

## 2025-04-02 PROCEDURE — RXMED WILLOW AMBULATORY MEDICATION CHARGE: Performed by: NURSE PRACTITIONER

## 2025-04-10 ENCOUNTER — PHARMACY VISIT (OUTPATIENT)
Dept: PHARMACY | Facility: MEDICAL CENTER | Age: 60
End: 2025-04-10
Payer: COMMERCIAL

## 2025-05-05 PROCEDURE — RXMED WILLOW AMBULATORY MEDICATION CHARGE: Performed by: NURSE PRACTITIONER

## 2025-05-06 ENCOUNTER — PHARMACY VISIT (OUTPATIENT)
Dept: PHARMACY | Facility: MEDICAL CENTER | Age: 60
End: 2025-05-06
Payer: COMMERCIAL

## 2025-05-09 ENCOUNTER — PHARMACY VISIT (OUTPATIENT)
Dept: PHARMACY | Facility: MEDICAL CENTER | Age: 60
End: 2025-05-09
Payer: COMMERCIAL

## 2025-05-16 PROCEDURE — RXMED WILLOW AMBULATORY MEDICATION CHARGE: Performed by: OBSTETRICS & GYNECOLOGY

## 2025-05-17 ENCOUNTER — PHARMACY VISIT (OUTPATIENT)
Dept: PHARMACY | Facility: MEDICAL CENTER | Age: 60
End: 2025-05-17
Payer: COMMERCIAL

## 2025-05-23 ENCOUNTER — OFFICE VISIT (OUTPATIENT)
Dept: DERMATOLOGY | Facility: IMAGING CENTER | Age: 60
End: 2025-05-23
Payer: COMMERCIAL

## 2025-05-23 DIAGNOSIS — D49.2 NEOPLASM OF SKIN: Primary | ICD-10-CM

## 2025-05-23 DIAGNOSIS — B35.3 TINEA PEDIS, UNSPECIFIED LATERALITY: ICD-10-CM

## 2025-05-23 NOTE — PROGRESS NOTES
"CC: RUSS    Subjective:Patient here for skin lesion    HPI/location: Lt cheek   Time present: 1 month   Painful lesion: No  Itching lesion: Yes  Enlarging lesion: Yes  Anything make it better or worse?   Denies new meds/lotions/products used. Starting peeling   Reports desire for testing for \"peace of mind\"    Previous visit   \"Scaling on feet for several weeks - had tried ketoconazole cream without relief.\"    Hx Lupus  History of skin cancer: No  History of precancers/actinic keratoses: No  History of biopsies:Yes, Details: 25 years ago Benign  History of blistering/severe sunburns:Yes, Details: Childhood  Family history of skin cancer:Yes, Details: Maternal Aunt unknown type  Family history of atypical moles:No    History of cold sores    ROS: no fevers/chills. +/- itch.  No cough  Relevant PMH:hx lupus - azathioprine/plaquenil  Social:NS    PE: Gen:WDWN female in NAD. Skin: focal exam:   -pink thin plaque on face, desquamation on periphery. Approx 2cm  -maceration between toes on feet    A/P:   Neoplasm NOS: left facial cheek - ISK vs other  -consent for bx, including R/B/A. Cleaned with EtOH, anesthesia with lidocaine 1% + epinephrine, partial shave bx, AlCl3 for hemostasis  -vaseline/bandage and wound care reviewed    Tinea pedis vs xerosis, feet:  -lamisil cream BID X 3 weeks advised, if no previous effect from lotrisone topical    Annual skin check    I have reviewed medications relevant to my specialty.         "

## 2025-05-30 ENCOUNTER — PHARMACY VISIT (OUTPATIENT)
Dept: PHARMACY | Facility: MEDICAL CENTER | Age: 60
End: 2025-05-30
Payer: COMMERCIAL

## 2025-05-30 PROCEDURE — RXMED WILLOW AMBULATORY MEDICATION CHARGE: Performed by: NURSE PRACTITIONER

## 2025-06-18 PROCEDURE — RXMED WILLOW AMBULATORY MEDICATION CHARGE: Performed by: OBSTETRICS & GYNECOLOGY

## 2025-06-21 ENCOUNTER — PHARMACY VISIT (OUTPATIENT)
Dept: PHARMACY | Facility: MEDICAL CENTER | Age: 60
End: 2025-06-21
Payer: COMMERCIAL

## 2025-06-23 ENCOUNTER — HOSPITAL ENCOUNTER (OUTPATIENT)
Dept: LAB | Facility: MEDICAL CENTER | Age: 60
End: 2025-06-23
Attending: NURSE PRACTITIONER
Payer: COMMERCIAL

## 2025-06-23 LAB
T4 FREE SERPL-MCNC: 1.42 NG/DL (ref 0.93–1.7)
TSH SERPL-ACNC: 0.64 UIU/ML (ref 0.38–5.33)

## 2025-06-23 PROCEDURE — 84443 ASSAY THYROID STIM HORMONE: CPT

## 2025-06-23 PROCEDURE — 84439 ASSAY OF FREE THYROXINE: CPT

## 2025-06-23 PROCEDURE — 36415 COLL VENOUS BLD VENIPUNCTURE: CPT

## 2025-07-01 PROCEDURE — RXMED WILLOW AMBULATORY MEDICATION CHARGE: Performed by: NURSE PRACTITIONER

## 2025-07-06 ENCOUNTER — PHARMACY VISIT (OUTPATIENT)
Dept: PHARMACY | Facility: MEDICAL CENTER | Age: 60
End: 2025-07-06
Payer: COMMERCIAL

## 2025-07-06 PROCEDURE — RXMED WILLOW AMBULATORY MEDICATION CHARGE: Performed by: NURSE PRACTITIONER

## 2025-07-06 PROCEDURE — RXMED WILLOW AMBULATORY MEDICATION CHARGE: Performed by: INTERNAL MEDICINE

## 2025-07-06 RX ORDER — AZATHIOPRINE 50 MG/1
150 TABLET ORAL DAILY
Qty: 90 TABLET | Refills: 2 | OUTPATIENT
Start: 2025-01-31

## 2025-07-14 ENCOUNTER — PHARMACY VISIT (OUTPATIENT)
Dept: PHARMACY | Facility: MEDICAL CENTER | Age: 60
End: 2025-07-14
Payer: COMMERCIAL

## 2025-07-14 PROCEDURE — RXOTC WILLOW AMBULATORY OTC CHARGE

## 2025-08-01 PROCEDURE — RXMED WILLOW AMBULATORY MEDICATION CHARGE: Performed by: NURSE PRACTITIONER

## 2025-08-01 PROCEDURE — RXMED WILLOW AMBULATORY MEDICATION CHARGE: Performed by: OBSTETRICS & GYNECOLOGY

## 2025-08-06 ENCOUNTER — PHARMACY VISIT (OUTPATIENT)
Dept: PHARMACY | Facility: MEDICAL CENTER | Age: 60
End: 2025-08-06
Payer: COMMERCIAL